# Patient Record
Sex: FEMALE | Race: OTHER | HISPANIC OR LATINO
[De-identification: names, ages, dates, MRNs, and addresses within clinical notes are randomized per-mention and may not be internally consistent; named-entity substitution may affect disease eponyms.]

---

## 2023-02-13 ENCOUNTER — APPOINTMENT (OUTPATIENT)
Dept: OBGYN | Facility: CLINIC | Age: 63
End: 2023-02-13
Payer: COMMERCIAL

## 2023-02-13 VITALS
DIASTOLIC BLOOD PRESSURE: 80 MMHG | HEIGHT: 60 IN | SYSTOLIC BLOOD PRESSURE: 121 MMHG | WEIGHT: 226 LBS | OXYGEN SATURATION: 96 % | BODY MASS INDEX: 44.37 KG/M2 | HEART RATE: 101 BPM

## 2023-02-13 DIAGNOSIS — N94.9 UNSPECIFIED CONDITION ASSOCIATED WITH FEMALE GENITAL ORGANS AND MENSTRUAL CYCLE: ICD-10-CM

## 2023-02-13 DIAGNOSIS — R10.9 UNSPECIFIED ABDOMINAL PAIN: ICD-10-CM

## 2023-02-13 DIAGNOSIS — N76.5 ULCERATION OF VAGINA: ICD-10-CM

## 2023-02-13 DIAGNOSIS — Z12.39 ENCOUNTER FOR OTHER SCREENING FOR MALIGNANT NEOPLASM OF BREAST: ICD-10-CM

## 2023-02-13 DIAGNOSIS — L90.0 LICHEN SCLEROSUS ET ATROPHICUS: ICD-10-CM

## 2023-02-13 PROCEDURE — 99386 PREV VISIT NEW AGE 40-64: CPT

## 2023-02-13 PROCEDURE — 99203 OFFICE O/P NEW LOW 30 MIN: CPT | Mod: 25

## 2023-02-14 ENCOUNTER — NON-APPOINTMENT (OUTPATIENT)
Age: 63
End: 2023-02-14

## 2023-02-14 LAB
HPV HIGH+LOW RISK DNA PNL CVX: NOT DETECTED
HSV+VZV DNA SPEC QL NAA+PROBE: NOT DETECTED
SPECIMEN SOURCE: NORMAL

## 2023-02-15 NOTE — COUNSELING
[Nutrition/ Exercise/ Weight Management] : nutrition, exercise, weight management [Body Image] : body image [Smoking Cessation] : smoking cessation [STD (testing, results, tx)] : STD (testing, results, tx) [Medication Management] : medication management

## 2023-02-15 NOTE — PLAN
[FreeTextEntry1] : Ms. Lema presents for well woman's  exam with complaint of vulvar and vaginal itching and findings concerning for lichen sclerosis. \par - Vitals reviewed and within normal limits. \par - Breast exam, pelvic exam and pap smear performed today. \par - Given Ulcerations noted, HSV swab obtained. \par - Findings concerning for lichen sclerosis  - patent agrees to trial of clobetasol. \par - Referral for breast US, screening mammogram and colonoscopy provided. \par - Patient preventative health actions reviewed-  encouraged well balanced diet and weight bearing exercise. Discussed patient's willingness to cut back smoking - patient is not interested. \par  \par Return to the office pending results, as needed for GYN concerns and in 1 year for annual follow up. Plan of care discussed with patient who has no additional questions and is in agreement.\par

## 2023-02-15 NOTE — PHYSICAL EXAM
[Chaperone Present] : A chaperone was present in the examining room during all aspects of the physical examination [Appropriately responsive] : appropriately responsive [Alert] : alert [No Acute Distress] : no acute distress [No Lesions] : no lesions [Oriented x3] : oriented x3 [FreeTextEntry3] : nontender thyroid [FreeTextEntry7] : no discrete mass palpated but possible umbilical hernia noted  [Examination Of The Breasts] : a normal appearance [Breast Atrophy Bilateral] : atrophy [No Masses] : no breast masses were palpable [Vulvar Atrophy] : vulvar atrophy [Vulvar Scarring] : vulvar scarring [Atrophy] : atrophy [Dry Mucosa] : dry mucosa [No Bleeding] : There was no active vaginal bleeding [Cervical Stenosis] : cervical stenosis [Normal] : normal [Uterine Adnexae] : non-palpable [FreeTextEntry1] : thinning of the skin, with white discoloration in a figure of 8 distribution on the vulvar and perineum ; ulcerations versus excoriations noted  [FreeTextEntry2] : loss of labial architecture with mild agglutination noted  [FreeTextEntry8] : Nontender, no CMT, no adnexal masses or fullness appreciated. exam limited by habitus

## 2023-02-15 NOTE — HISTORY OF PRESENT ILLNESS
[Patient reported mammogram was normal] : Patient reported mammogram was normal [Patient reported breast sonogram was normal] : Patient reported breast sonogram was normal [Patient reported PAP Smear was normal] : Patient reported PAP Smear was normal [Y] : Positive pregnancy history [Previously active] : previously active [FreeTextEntry1] : Ms. Carmencita Lema  is a 62 year old female who presents today for annual exam. She reports that she feels overall well but anxious about her appointment. She reports long standing history of vulvar and vaginal irritation and itching. \par \par OB history:  x2, ectopic x2\par GYN history: denies history of abnormal pap smears, last pap semar in 2018; endorses history of vulvar irritation and discoloration previously treated with clobetasol \par - Postmenopausal since age 57\par - no bothersome menopausal symptoms \par - not presently sexually active \par SocialHx: lives with her mother who has dementia; works for the department of [employer]; current pack per day smoker (not interested in cutting back or quitting at this time)\par \par Patient denies vaginal bleeding, vaginal discharge, dysuria, changes to her bowel habits, incontinence or any other GYN symptoms.\par  [Mammogramdate] : 2018 [BreastSonogramDate] : 2018 [PapSmeardate] : 2018 [PGxTotal] : 11 [[facility]] : 2 [PGxEctopic] : 2

## 2023-02-17 LAB
A VAGINAE DNA VAG QL NAA+PROBE: NORMAL
BVAB2 DNA VAG QL NAA+PROBE: NORMAL
C KRUSEI DNA VAG QL NAA+PROBE: NEGATIVE
C TRACH RRNA SPEC QL NAA+PROBE: NEGATIVE
CYTOLOGY CVX/VAG DOC THIN PREP: NORMAL
MEGA1 DNA VAG QL NAA+PROBE: NORMAL
N GONORRHOEA RRNA SPEC QL NAA+PROBE: NEGATIVE
T VAGINALIS RRNA SPEC QL NAA+PROBE: NEGATIVE

## 2023-02-28 ENCOUNTER — APPOINTMENT (OUTPATIENT)
Dept: GASTROENTEROLOGY | Facility: CLINIC | Age: 63
End: 2023-02-28
Payer: COMMERCIAL

## 2023-02-28 VITALS
HEART RATE: 84 BPM | TEMPERATURE: 97.4 F | WEIGHT: 223 LBS | SYSTOLIC BLOOD PRESSURE: 109 MMHG | DIASTOLIC BLOOD PRESSURE: 75 MMHG | OXYGEN SATURATION: 99 % | HEIGHT: 60 IN | BODY MASS INDEX: 43.78 KG/M2

## 2023-02-28 DIAGNOSIS — Z12.11 ENCOUNTER FOR SCREENING FOR MALIGNANT NEOPLASM OF COLON: ICD-10-CM

## 2023-02-28 DIAGNOSIS — R19.00 INTRA-ABDOMINAL AND PELVIC SWELLING, MASS AND LUMP, UNSPECIFIED SITE: ICD-10-CM

## 2023-02-28 PROCEDURE — 99204 OFFICE O/P NEW MOD 45 MIN: CPT

## 2023-02-28 RX ORDER — POLYETHYLENE GLYCOL 3350, SODIUM CHLORIDE, SODIUM BICARBONATE AND POTASSIUM CHLORIDE WITH LEMON FLAVOR 420; 11.2; 5.72; 1.48 G/4L; G/4L; G/4L; G/4L
420 POWDER, FOR SOLUTION ORAL
Qty: 1 | Refills: 0 | Status: ACTIVE | COMMUNITY
Start: 2023-02-28 | End: 1900-01-01

## 2023-02-28 NOTE — PHYSICAL EXAM
[Normal] : alert, normal voice/communication, healthy appearing, no acute distress [Obese (BMI >= 30)] : obese (BMI >= 30) [Bowel Sounds] : normal bowel sounds

## 2023-02-28 NOTE — HISTORY OF PRESENT ILLNESS
[FreeTextEntry1] : Preferred Language: English\par \par angelika gonzalez is a 62 year F here for CRC screening. She has a history of obesity\par \par GI History: Prior colonoscopy around 2010 with many polyps (> 10?)\par \par Current symptoms: Reports some pain/bulging in LUQ. Not related to eating. Intermittent and tender. Worsening over time. Denies any skin changes or excruciating pain. \par \par GI ROS negative for unintentional weight loss, fevers/chills, dysphagia, reflux, abdominal pain, bloating, nausea, vomiting, early satiety, diarrhea, constipation, melena, hematochezia. Patient denies NSAID use.\par \par Current Meds: None\par All: NKDA\par FHx: No GI cancers, son has UC\par SHx: Current smoker, rare EtOH\par \par Relevant Exam:\par Obese\par Abd wall tenderness LUQ, palpable mass\par \par \par \par

## 2023-02-28 NOTE — ASSESSMENT
[FreeTextEntry1] : Impression:\par #Hx of colon polyps - overdue for repeat colonoscopy \par #Abd wall mass - agree w/ Dr. Trevino (gyn) that this is likely umbilical hernia. \par \par Plan:\par - Schedule colonoscopy for surveillance, hx of colon polyps. [facility] w/ adult scope given BMI\par - Risks (including anesthesia complications, bleeding, infection, perforation) as well as benefits, alternatives, and details of procedure discussed w/ patient.\par - Prep instructions discussed at length and written materials provided.\par - Golytely prep ordered.\par - COVID swab ordered.\par - Need for chaperone discussed.\par

## 2023-03-01 DIAGNOSIS — Z12.4 ENCOUNTER FOR SCREENING FOR MALIGNANT NEOPLASM OF CERVIX: ICD-10-CM

## 2023-03-01 LAB
ALBUMIN SERPL ELPH-MCNC: 4.1 G/DL
ALP BLD-CCNC: 63 U/L
ALT SERPL-CCNC: 23 U/L
ANION GAP SERPL CALC-SCNC: 17 MMOL/L
AST SERPL-CCNC: 17 U/L
BASOPHILS # BLD AUTO: 0.06 K/UL
BASOPHILS NFR BLD AUTO: 0.6 %
BILIRUB SERPL-MCNC: <0.2 MG/DL
BUN SERPL-MCNC: 18 MG/DL
CALCIUM SERPL-MCNC: 10 MG/DL
CHLORIDE SERPL-SCNC: 101 MMOL/L
CHOLEST SERPL-MCNC: 217 MG/DL
CO2 SERPL-SCNC: 22 MMOL/L
CREAT SERPL-MCNC: 0.87 MG/DL
EGFR: 76 ML/MIN/1.73M2
EOSINOPHIL # BLD AUTO: 0.24 K/UL
EOSINOPHIL NFR BLD AUTO: 2.5 %
ESTIMATED AVERAGE GLUCOSE: 146 MG/DL
FERRITIN SERPL-MCNC: 164 NG/ML
GLUCOSE SERPL-MCNC: 103 MG/DL
HBA1C MFR BLD HPLC: 6.7 %
HCT VFR BLD CALC: 42.5 %
HCV AB SER QL: NONREACTIVE
HCV S/CO RATIO: 0.13 S/CO
HDLC SERPL-MCNC: 40 MG/DL
HGB BLD-MCNC: 13.9 G/DL
IMM GRANULOCYTES NFR BLD AUTO: 0.3 %
IRON SATN MFR SERPL: 24 %
IRON SERPL-MCNC: 82 UG/DL
LDLC SERPL CALC-MCNC: NORMAL MG/DL
LYMPHOCYTES # BLD AUTO: 3.08 K/UL
LYMPHOCYTES NFR BLD AUTO: 32.4 %
MAN DIFF?: NORMAL
MCHC RBC-ENTMCNC: 31.3 PG
MCHC RBC-ENTMCNC: 32.7 GM/DL
MCV RBC AUTO: 95.7 FL
MONOCYTES # BLD AUTO: 0.67 K/UL
MONOCYTES NFR BLD AUTO: 7 %
NEUTROPHILS # BLD AUTO: 5.43 K/UL
NEUTROPHILS NFR BLD AUTO: 57.2 %
NONHDLC SERPL-MCNC: 178 MG/DL
PLATELET # BLD AUTO: 284 K/UL
POTASSIUM SERPL-SCNC: 4.5 MMOL/L
PROT SERPL-MCNC: 7.5 G/DL
RBC # BLD: 4.44 M/UL
RBC # FLD: 13.8 %
SODIUM SERPL-SCNC: 141 MMOL/L
TIBC SERPL-MCNC: 338 UG/DL
TRIGL SERPL-MCNC: 453 MG/DL
UIBC SERPL-MCNC: 256 UG/DL
WBC # FLD AUTO: 9.51 K/UL

## 2023-03-03 ENCOUNTER — APPOINTMENT (OUTPATIENT)
Dept: INTERNAL MEDICINE | Facility: CLINIC | Age: 63
End: 2023-03-03
Payer: COMMERCIAL

## 2023-03-03 ENCOUNTER — NON-APPOINTMENT (OUTPATIENT)
Age: 63
End: 2023-03-03

## 2023-03-03 ENCOUNTER — TRANSCRIPTION ENCOUNTER (OUTPATIENT)
Age: 63
End: 2023-03-03

## 2023-03-03 VITALS
TEMPERATURE: 97 F | DIASTOLIC BLOOD PRESSURE: 55 MMHG | WEIGHT: 222 LBS | OXYGEN SATURATION: 96 % | BODY MASS INDEX: 43.36 KG/M2 | SYSTOLIC BLOOD PRESSURE: 90 MMHG | HEART RATE: 84 BPM

## 2023-03-03 VITALS — DIASTOLIC BLOOD PRESSURE: 75 MMHG | SYSTOLIC BLOOD PRESSURE: 112 MMHG

## 2023-03-03 DIAGNOSIS — J45.20 MILD INTERMITTENT ASTHMA, UNCOMPLICATED: ICD-10-CM

## 2023-03-03 DIAGNOSIS — F32.A ANXIETY DISORDER, UNSPECIFIED: ICD-10-CM

## 2023-03-03 DIAGNOSIS — F41.9 ANXIETY DISORDER, UNSPECIFIED: ICD-10-CM

## 2023-03-03 DIAGNOSIS — J44.9 CHRONIC OBSTRUCTIVE PULMONARY DISEASE, UNSPECIFIED: ICD-10-CM

## 2023-03-03 DIAGNOSIS — Z84.89 FAMILY HISTORY OF OTHER SPECIFIED CONDITIONS: ICD-10-CM

## 2023-03-03 DIAGNOSIS — Z83.3 FAMILY HISTORY OF DIABETES MELLITUS: ICD-10-CM

## 2023-03-03 DIAGNOSIS — Z00.00 ENCOUNTER FOR GENERAL ADULT MEDICAL EXAMINATION W/OUT ABNORMAL FINDINGS: ICD-10-CM

## 2023-03-03 DIAGNOSIS — Z82.49 FAMILY HISTORY OF ISCHEMIC HEART DISEASE AND OTHER DISEASES OF THE CIRCULATORY SYSTEM: ICD-10-CM

## 2023-03-03 DIAGNOSIS — Z83.79 FAMILY HISTORY OF OTHER DISEASES OF THE DIGESTIVE SYSTEM: ICD-10-CM

## 2023-03-03 DIAGNOSIS — H81.11 BENIGN PAROXYSMAL VERTIGO, RIGHT EAR: ICD-10-CM

## 2023-03-03 DIAGNOSIS — Z23 ENCOUNTER FOR IMMUNIZATION: ICD-10-CM

## 2023-03-03 PROCEDURE — 99386 PREV VISIT NEW AGE 40-64: CPT | Mod: 25

## 2023-03-03 PROCEDURE — 36415 COLL VENOUS BLD VENIPUNCTURE: CPT

## 2023-03-03 PROCEDURE — 90686 IIV4 VACC NO PRSV 0.5 ML IM: CPT

## 2023-03-03 PROCEDURE — 93000 ELECTROCARDIOGRAM COMPLETE: CPT

## 2023-03-03 PROCEDURE — G0008: CPT

## 2023-03-03 PROCEDURE — 99203 OFFICE O/P NEW LOW 30 MIN: CPT | Mod: 25

## 2023-03-03 RX ORDER — FLUTICASONE FUROATE, UMECLIDINIUM BROMIDE AND VILANTEROL TRIFENATATE 200; 62.5; 25 UG/1; UG/1; UG/1
200-62.5-25 POWDER RESPIRATORY (INHALATION)
Refills: 0 | Status: ACTIVE | COMMUNITY

## 2023-03-03 NOTE — HEALTH RISK ASSESSMENT
[Current] : Current [20 or more] : 20 or more [No] : In the past 12 months have you used drugs other than those required for medical reasons? No [Nearly Every Day (3)] : 6.) Feeling bad about yourself, or that you are a failure, or have let yourself or your family down? Nearly every day [Several Days (1)] : 7.) Trouble concentrating on things, such as reading a newspaper or watching television? Several days [Not at All (0)] : 8.) Moving or speaking so slowly that other people could have noticed, or the opposite, moving or speaking faster than usual? Not at all [Mild] : severity of depression is mild [Somewhat Difficult] : How difficult have these problems made it for you to do your work, take care of things at home, or get along with people? Somewhat difficult [PHQ-9 Positive] : PHQ-9 Positive [I have developed a follow-up plan documented below in the note.] : I have developed a follow-up plan documented below in the note. [Fully functional (bathing, dressing, toileting, transferring, walking, feeding)] : Fully functional (bathing, dressing, toileting, transferring, walking, feeding) [Fully functional (using the telephone, shopping, preparing meals, housekeeping, doing laundry, using] : Fully functional and needs no help or supervision to perform IADLs (using the telephone, shopping, preparing meals, housekeeping, doing laundry, using transportation, managing medications and managing finances) [Reports changes in vision] : Reports changes in vision [de-identified] : walking  [de-identified] : unhealthy diet  [[id number]] : 7 [Reports changes in hearing] : Reports no changes in hearing [Reports changes in dental health] : Reports no changes in dental health

## 2023-03-03 NOTE — HISTORY OF PRESENT ILLNESS
[de-identified] : Ms. angelika gonzalez is a 62 year old female smoker with history of Diabetes, Asthma/COPD, HLD, and obesity presenting today for CPE. She saw Dr. Montalvo earlier this week to discuss CRC screening and she is scheduled for colonoscopy next month as well as ABD sono for possible hernia. She also had her annual GYN viist with Dr. Trevino last month and likely has lichen sclerosis being treated with topical steroid. Last year she moved in with her mother who has dementia and most of her doctors were in [place]. She still follows with her pulmonologist in [place], but requests a referral to a doctor closer to her.

## 2023-03-03 NOTE — ASSESSMENT
[FreeTextEntry1] : #HCM\par - Flu vaccine today\par - due for COVID booster\par - had Tdap and pneumonia vaccine in [place]; will obtain records\par - discussed age appropriate cancer screenings and vaccines\par - discussed healthy diet and exercise\par - follow up in 3 months

## 2023-03-03 NOTE — PHYSICAL EXAM
[No Respiratory Distress] : no respiratory distress  [No Accessory Muscle Use] : no accessory muscle use [Decreased Breath Sounds] : breath sounds were decreased diffusely [No Edema] : there was no peripheral edema [Soft] : abdomen soft [No HSM] : no HSM [Normal Bowel Sounds] : normal bowel sounds [Normal] : affect was normal and insight and judgment were intact [de-identified] : mild  periumbilical TTP  [de-identified] : numerous melanotic nevi

## 2023-03-06 LAB
APPEARANCE: CLEAR
BACTERIA: NEGATIVE
BILIRUBIN URINE: NEGATIVE
BLOOD URINE: ABNORMAL
CHOLEST SERPL-MCNC: 222 MG/DL
COLOR: YELLOW
CREAT SPEC-SCNC: 219 MG/DL
GLUCOSE QUALITATIVE U: NEGATIVE
HDLC SERPL-MCNC: 44 MG/DL
HYALINE CASTS: 1 /LPF
KETONES URINE: NEGATIVE
LDLC SERPL CALC-MCNC: 146 MG/DL
LEUKOCYTE ESTERASE URINE: NEGATIVE
MICROALBUMIN 24H UR DL<=1MG/L-MCNC: 6.6 MG/DL
MICROALBUMIN/CREAT 24H UR-RTO: 30 MG/G
MICROSCOPIC-UA: NORMAL
NITRITE URINE: NEGATIVE
NONHDLC SERPL-MCNC: 178 MG/DL
PH URINE: 6
PROTEIN URINE: NORMAL
RED BLOOD CELLS URINE: 7 /HPF
SPECIFIC GRAVITY URINE: >=1.03
SQUAMOUS EPITHELIAL CELLS: 6 /HPF
TRIGL SERPL-MCNC: 163 MG/DL
UROBILINOGEN URINE: NORMAL
WHITE BLOOD CELLS URINE: 3 /HPF

## 2023-03-06 RX ORDER — ALBUTEROL SULFATE 90 UG/1
108 (90 BASE) INHALANT RESPIRATORY (INHALATION)
Qty: 1 | Refills: 5 | Status: ACTIVE | COMMUNITY
Start: 1900-01-01 | End: 1900-01-01

## 2023-03-27 ENCOUNTER — NON-APPOINTMENT (OUTPATIENT)
Age: 63
End: 2023-03-27

## 2023-03-27 ENCOUNTER — APPOINTMENT (OUTPATIENT)
Dept: OPHTHALMOLOGY | Facility: CLINIC | Age: 63
End: 2023-03-27
Payer: COMMERCIAL

## 2023-03-27 PROCEDURE — 92004 COMPRE OPH EXAM NEW PT 1/>: CPT

## 2023-04-06 ENCOUNTER — TRANSCRIPTION ENCOUNTER (OUTPATIENT)
Age: 63
End: 2023-04-06

## 2023-04-07 ENCOUNTER — OUTPATIENT (OUTPATIENT)
Dept: OUTPATIENT SERVICES | Facility: HOSPITAL | Age: 63
LOS: 1 days | Discharge: HOME | End: 2023-04-07
Payer: COMMERCIAL

## 2023-04-07 ENCOUNTER — RESULT REVIEW (OUTPATIENT)
Age: 63
End: 2023-04-07

## 2023-04-07 ENCOUNTER — APPOINTMENT (OUTPATIENT)
Dept: GASTROENTEROLOGY | Facility: HOSPITAL | Age: 63
End: 2023-04-07

## 2023-04-07 PROCEDURE — 88305 TISSUE EXAM BY PATHOLOGIST: CPT | Mod: 26

## 2023-04-07 PROCEDURE — 88305 TISSUE EXAM BY PATHOLOGIST: CPT

## 2023-04-07 PROCEDURE — 45380 COLONOSCOPY AND BIOPSY: CPT | Mod: XU

## 2023-04-07 PROCEDURE — 45385 COLONOSCOPY W/LESION REMOVAL: CPT

## 2023-04-07 PROCEDURE — 45385 COLONOSCOPY W/LESION REMOVAL: CPT | Mod: PT

## 2023-04-07 RX ORDER — SODIUM CHLORIDE 9 MG/ML
1000 INJECTION, SOLUTION INTRAVENOUS
Refills: 0 | Status: DISCONTINUED | OUTPATIENT
Start: 2023-04-07 | End: 2023-04-07

## 2023-04-10 ENCOUNTER — NON-APPOINTMENT (OUTPATIENT)
Age: 63
End: 2023-04-10

## 2023-04-10 ENCOUNTER — APPOINTMENT (OUTPATIENT)
Dept: OBGYN | Facility: CLINIC | Age: 63
End: 2023-04-10
Payer: COMMERCIAL

## 2023-04-10 VITALS — SYSTOLIC BLOOD PRESSURE: 107 MMHG | OXYGEN SATURATION: 96 % | DIASTOLIC BLOOD PRESSURE: 71 MMHG | HEART RATE: 73 BPM

## 2023-04-10 LAB — SURGICAL PATHOLOGY STUDY: SIGNIFICANT CHANGE UP

## 2023-04-10 PROCEDURE — 99213 OFFICE O/P EST LOW 20 MIN: CPT

## 2023-04-10 RX ORDER — CLOBETASOL PROPIONATE 0.5 MG/G
0.05 OINTMENT TOPICAL
Qty: 1 | Refills: 2 | Status: ACTIVE | COMMUNITY
Start: 2023-02-13 | End: 1900-01-01

## 2023-04-14 ENCOUNTER — TRANSCRIPTION ENCOUNTER (OUTPATIENT)
Age: 63
End: 2023-04-14

## 2023-04-14 RX ORDER — BLOOD-GLUCOSE METER
W/DEVICE KIT MISCELLANEOUS
Qty: 1 | Refills: 0 | Status: ACTIVE | COMMUNITY
Start: 2023-03-03 | End: 1900-01-01

## 2023-04-16 NOTE — PLAN
[FreeTextEntry1] : Ms. Lema presents for follow up pelvic exam in the setting of lichen sclerosis. \par - Vitals reviewed and within normal limits. \par - Physical exam fiindings improved - continue daily clobetasol for two months.  \par \par Plan of care discussed with patient who has no additional questions and is in agreement.\par \par

## 2023-04-16 NOTE — PHYSICAL EXAM
[Chaperone Present] : A chaperone was present in the examining room during all aspects of the physical examination [Appropriately responsive] : appropriately responsive [Alert] : alert [No Acute Distress] : no acute distress [Oriented x3] : oriented x3 [Vulvar Atrophy] : vulvar atrophy [Vulvar Scarring] : vulvar scarring [Normal] : normal [Atrophy] : atrophy [Dry Mucosa] : dry mucosa [No Bleeding] : There was no active vaginal bleeding [FreeTextEntry1] : decreased white discoloration; decreased erythema  [FreeTextEntry2] : loss of labial architecture with minimal agglutination noted

## 2023-04-16 NOTE — HISTORY OF PRESENT ILLNESS
[FreeTextEntry1] : Ms. RIVKA OWEN  is a 63 year old female who presents today for follow up pelvic exam in the setting of lichen sclerosis. She has been using the clobetasol daily. Her vaginal irritation has improved and she has minimal itching. She reports that she feels overall well and that she has no other GYN complaints.  \par \par Patient denies vaginal bleeding, vaginal discharge, dysuria, changes to her bowel habits, incontinence or any other GYN symptoms.\par

## 2023-04-25 ENCOUNTER — APPOINTMENT (OUTPATIENT)
Dept: INTERNAL MEDICINE | Facility: CLINIC | Age: 63
End: 2023-04-25
Payer: COMMERCIAL

## 2023-04-25 VITALS
OXYGEN SATURATION: 98 % | BODY MASS INDEX: 43.59 KG/M2 | HEIGHT: 60 IN | SYSTOLIC BLOOD PRESSURE: 109 MMHG | HEART RATE: 74 BPM | TEMPERATURE: 97.5 F | DIASTOLIC BLOOD PRESSURE: 65 MMHG | WEIGHT: 222 LBS

## 2023-04-25 DIAGNOSIS — E66.01 MORBID (SEVERE) OBESITY DUE TO EXCESS CALORIES: ICD-10-CM

## 2023-04-25 PROCEDURE — 99214 OFFICE O/P EST MOD 30 MIN: CPT

## 2023-04-25 RX ORDER — METFORMIN HYDROCHLORIDE 500 MG/1
500 TABLET, COATED ORAL
Qty: 2 | Refills: 1 | Status: ACTIVE | COMMUNITY
Start: 2023-04-25 | End: 1900-01-01

## 2023-04-25 NOTE — HISTORY OF PRESENT ILLNESS
[de-identified] : Ms. angelika gonzalez is a 63 year old female smoker with history of Diabetes, Asthma/COPD, HLD, and obesity presenting today for follow up to complete forms for work. She has questions about her T2DM.

## 2023-04-25 NOTE — PHYSICAL EXAM
[Normal Sclera/Conjunctiva] : normal sclera/conjunctiva [Normal Outer Ear/Nose] : the outer ears and nose were normal in appearance [No Edema] : there was no peripheral edema [Soft] : abdomen soft [Non Tender] : non-tender [Normal] : affect was normal and insight and judgment were intact

## 2023-04-28 ENCOUNTER — APPOINTMENT (OUTPATIENT)
Dept: DERMATOLOGY | Facility: CLINIC | Age: 63
End: 2023-04-28
Payer: COMMERCIAL

## 2023-04-28 DIAGNOSIS — Z12.83 ENCOUNTER FOR SCREENING FOR MALIGNANT NEOPLASM OF SKIN: ICD-10-CM

## 2023-04-28 DIAGNOSIS — L82.1 OTHER SEBORRHEIC KERATOSIS: ICD-10-CM

## 2023-04-28 DIAGNOSIS — L91.8 OTHER HYPERTROPHIC DISORDERS OF THE SKIN: ICD-10-CM

## 2023-04-28 DIAGNOSIS — D18.01 HEMANGIOMA OF SKIN AND SUBCUTANEOUS TISSUE: ICD-10-CM

## 2023-04-28 PROCEDURE — 99203 OFFICE O/P NEW LOW 30 MIN: CPT

## 2023-05-01 PROBLEM — D18.01 CHERRY ANGIOMA: Status: ACTIVE | Noted: 2023-05-01

## 2023-05-01 PROBLEM — L82.1 DERMATOSIS PAPULOSA NIGRA: Status: ACTIVE | Noted: 2023-05-01

## 2023-05-01 PROBLEM — L91.8 CUTANEOUS SKIN TAGS: Status: ACTIVE | Noted: 2023-05-01

## 2023-05-01 PROBLEM — Z12.83 SKIN CANCER SCREENING: Status: ACTIVE | Noted: 2023-03-03

## 2023-05-01 NOTE — PHYSICAL EXAM
[Alert] : alert [Oriented x 3] : ~L oriented x 3 [Full Body Skin Exam Performed] : performed [FreeTextEntry3] : Pt consented to examination of external genitalia and buttocks\par \par Small erythematous papules scattered\par Stuck on brown papules on face, neck, upper chest\par Pedunculated skin toned papules scattered\par \par

## 2023-05-01 NOTE — ASSESSMENT
[FreeTextEntry1] : # Dermatosis papulosa nigra\par - Benign, reassured\par - Discussed cosmetic removal if desired\par \par # Skin tags\par - Benign, reassured\par - Discussed cosmetic removal if desired\par \par # Cherry angiomas\par - Benign, reassured\par \par # Skin cancer screening\par - A complete skin exam was performed\par - No concerning lesions identified\par - Photoprotection was discussed including sunscreen\par - Call for new or changing lesions\par - CBE yearly\par 
0.71

## 2023-05-01 NOTE — HISTORY OF PRESENT ILLNESS
[FreeTextEntry1] : Skin lesions, moles - NPA [de-identified] : # Mole/skin check\par Concerns today: moles on face, chest, body\par Sunscreen use: no\par Personal history of skin cancer: none\par Family history of melanoma: none\par

## 2023-05-05 LAB
M TB IFN-G BLD-IMP: NEGATIVE
QUANTIFERON TB PLUS MITOGEN MINUS NIL: 4.58 IU/ML
QUANTIFERON TB PLUS NIL: 0.01 IU/ML
QUANTIFERON TB PLUS TB1 MINUS NIL: 0 IU/ML
QUANTIFERON TB PLUS TB2 MINUS NIL: 0 IU/ML

## 2023-06-05 ENCOUNTER — APPOINTMENT (OUTPATIENT)
Dept: OBGYN | Facility: CLINIC | Age: 63
End: 2023-06-05

## 2023-08-09 ENCOUNTER — APPOINTMENT (OUTPATIENT)
Dept: INTERNAL MEDICINE | Facility: CLINIC | Age: 63
End: 2023-08-09
Payer: COMMERCIAL

## 2023-08-09 VITALS
DIASTOLIC BLOOD PRESSURE: 77 MMHG | SYSTOLIC BLOOD PRESSURE: 106 MMHG | TEMPERATURE: 96.7 F | HEART RATE: 86 BPM | BODY MASS INDEX: 41.6 KG/M2 | WEIGHT: 213 LBS | OXYGEN SATURATION: 94 %

## 2023-08-09 DIAGNOSIS — F17.200 NICOTINE DEPENDENCE, UNSPECIFIED, UNCOMPLICATED: ICD-10-CM

## 2023-08-09 DIAGNOSIS — M54.32 SCIATICA, LEFT SIDE: ICD-10-CM

## 2023-08-09 DIAGNOSIS — E78.00 PURE HYPERCHOLESTEROLEMIA, UNSPECIFIED: ICD-10-CM

## 2023-08-09 PROCEDURE — 36415 COLL VENOUS BLD VENIPUNCTURE: CPT

## 2023-08-09 PROCEDURE — 99214 OFFICE O/P EST MOD 30 MIN: CPT | Mod: 25

## 2023-08-09 RX ORDER — METHYLPREDNISOLONE 4 MG/1
4 TABLET ORAL
Qty: 1 | Refills: 0 | Status: ACTIVE | COMMUNITY
Start: 2023-08-09 | End: 1900-01-01

## 2023-08-09 NOTE — HISTORY OF PRESENT ILLNESS
[de-identified] : Covering Dr Varghese. 63 yr old w obesity, DM2, HLD, here c/o left sciatica. Camping last week tweaked her back, pain radiating from lower back on left down left leg; no weakness or numbness, but significant pain, pain ambulating.  Taking metformin. Discussed ozempic again; wiling to try.

## 2023-08-09 NOTE — PLAN
[FreeTextEntry1] :  Start Ozempic  f/u 3 mo  re sciatica advised on goal of weight loss, to maintain activity etc f/u prn

## 2023-08-10 LAB
ALBUMIN SERPL ELPH-MCNC: 4.1 G/DL
ALP BLD-CCNC: 64 U/L
ALT SERPL-CCNC: 28 U/L
ANION GAP SERPL CALC-SCNC: 12 MMOL/L
AST SERPL-CCNC: 19 U/L
BILIRUB SERPL-MCNC: 0.2 MG/DL
BUN SERPL-MCNC: 16 MG/DL
CALCIUM SERPL-MCNC: 9.8 MG/DL
CHLORIDE SERPL-SCNC: 106 MMOL/L
CHOLEST SERPL-MCNC: 163 MG/DL
CO2 SERPL-SCNC: 23 MMOL/L
CREAT SERPL-MCNC: 0.76 MG/DL
EGFR: 89 ML/MIN/1.73M2
ESTIMATED AVERAGE GLUCOSE: 143 MG/DL
GLUCOSE SERPL-MCNC: 98 MG/DL
HBA1C MFR BLD HPLC: 6.6 %
HDLC SERPL-MCNC: 40 MG/DL
LDLC SERPL CALC-MCNC: 86 MG/DL
NONHDLC SERPL-MCNC: 123 MG/DL
POTASSIUM SERPL-SCNC: 4.4 MMOL/L
PROT SERPL-MCNC: 7 G/DL
SODIUM SERPL-SCNC: 141 MMOL/L
TRIGL SERPL-MCNC: 219 MG/DL

## 2023-11-14 DIAGNOSIS — Z02.1 ENCOUNTER FOR PRE-EMPLOYMENT EXAMINATION: ICD-10-CM

## 2023-11-15 ENCOUNTER — RX RENEWAL (OUTPATIENT)
Age: 63
End: 2023-11-15

## 2023-11-20 LAB
MEV IGG FLD QL IA: 97.8 AU/ML
MEV IGG+IGM SER-IMP: POSITIVE
MUV AB SER-ACNC: POSITIVE
MUV IGG SER QL IA: >300 AU/ML
RUBV IGG FLD-ACNC: 7.3 INDEX
RUBV IGG SER-IMP: POSITIVE
VZV AB TITR SER: POSITIVE
VZV IGG SER IF-ACNC: 481.9 INDEX

## 2023-11-21 LAB
AMPHET UR-MCNC: NEGATIVE NG/ML
BARBITURATES UR-MCNC: NEGATIVE NG/ML
BENZODIAZ UR-MCNC: NEGATIVE NG/ML
COCAINE METAB.OTHER UR-MCNC: NEGATIVE NG/ML
CREATININE, URINE: 137.4 MG/DL
FENTANYL, URINE: NEGATIVE NG/ML
METHADONE UR-MCNC: NEGATIVE NG/ML
OPIATES UR-MCNC: NEGATIVE NG/ML
OXYCODONE/OXYMORPHONE, URINE: NEGATIVE NG/ML
PCP UR-MCNC: NEGATIVE NG/ML
PH, URINE: 5.3
PLEASE NOTE: DRUGSCRUR: NORMAL
THC UR QL: NEGATIVE NG/ML

## 2024-01-02 ENCOUNTER — TRANSCRIPTION ENCOUNTER (OUTPATIENT)
Age: 64
End: 2024-01-02

## 2024-03-05 ENCOUNTER — APPOINTMENT (OUTPATIENT)
Dept: INTERNAL MEDICINE | Facility: CLINIC | Age: 64
End: 2024-03-05
Payer: COMMERCIAL

## 2024-03-05 VITALS
WEIGHT: 204 LBS | BODY MASS INDEX: 40.05 KG/M2 | HEART RATE: 79 BPM | OXYGEN SATURATION: 98 % | SYSTOLIC BLOOD PRESSURE: 119 MMHG | DIASTOLIC BLOOD PRESSURE: 78 MMHG | TEMPERATURE: 97.5 F | HEIGHT: 60 IN

## 2024-03-05 DIAGNOSIS — E11.9 TYPE 2 DIABETES MELLITUS W/OUT COMPLICATIONS: ICD-10-CM

## 2024-03-05 DIAGNOSIS — R42 DIZZINESS AND GIDDINESS: ICD-10-CM

## 2024-03-05 PROCEDURE — 99214 OFFICE O/P EST MOD 30 MIN: CPT

## 2024-03-05 PROCEDURE — 36415 COLL VENOUS BLD VENIPUNCTURE: CPT

## 2024-03-05 PROCEDURE — G2211 COMPLEX E/M VISIT ADD ON: CPT

## 2024-03-05 RX ORDER — BLOOD-GLUCOSE,RECEIVER,CONT
EACH MISCELLANEOUS
Qty: 1 | Refills: 0 | Status: ACTIVE | COMMUNITY
Start: 2024-03-05 | End: 1900-01-01

## 2024-03-05 RX ORDER — MECLIZINE HYDROCHLORIDE 25 MG/1
25 TABLET ORAL 3 TIMES DAILY
Qty: 21 | Refills: 0 | Status: ACTIVE | COMMUNITY
Start: 2024-03-05 | End: 1900-01-01

## 2024-03-05 RX ORDER — BLOOD-GLUCOSE SENSOR
EACH MISCELLANEOUS
Qty: 6 | Refills: 1 | Status: ACTIVE | COMMUNITY
Start: 2024-03-05 | End: 1900-01-01

## 2024-03-06 LAB
ALBUMIN SERPL ELPH-MCNC: 4.2 G/DL
ALP BLD-CCNC: 69 U/L
ALT SERPL-CCNC: 21 U/L
ANION GAP SERPL CALC-SCNC: 12 MMOL/L
AST SERPL-CCNC: 18 U/L
BASOPHILS # BLD AUTO: 0.06 K/UL
BASOPHILS NFR BLD AUTO: 0.6 %
BILIRUB SERPL-MCNC: 0.2 MG/DL
BUN SERPL-MCNC: 15 MG/DL
CALCIUM SERPL-MCNC: 10.1 MG/DL
CHLORIDE SERPL-SCNC: 105 MMOL/L
CHOLEST SERPL-MCNC: 200 MG/DL
CO2 SERPL-SCNC: 24 MMOL/L
CREAT SERPL-MCNC: 0.91 MG/DL
EGFR: 72 ML/MIN/1.73M2
EOSINOPHIL # BLD AUTO: 0.16 K/UL
EOSINOPHIL NFR BLD AUTO: 1.6 %
ESTIMATED AVERAGE GLUCOSE: 128 MG/DL
GLUCOSE SERPL-MCNC: 95 MG/DL
HBA1C MFR BLD HPLC: 6.1 %
HCT VFR BLD CALC: 41.4 %
HDLC SERPL-MCNC: 40 MG/DL
HGB BLD-MCNC: 13.2 G/DL
IMM GRANULOCYTES NFR BLD AUTO: 0.3 %
LDLC SERPL CALC-MCNC: 121 MG/DL
LYMPHOCYTES # BLD AUTO: 3.21 K/UL
LYMPHOCYTES NFR BLD AUTO: 32.6 %
MAN DIFF?: NORMAL
MCHC RBC-ENTMCNC: 30.1 PG
MCHC RBC-ENTMCNC: 31.9 GM/DL
MCV RBC AUTO: 94.3 FL
MONOCYTES # BLD AUTO: 0.74 K/UL
MONOCYTES NFR BLD AUTO: 7.5 %
NEUTROPHILS # BLD AUTO: 5.65 K/UL
NEUTROPHILS NFR BLD AUTO: 57.4 %
NONHDLC SERPL-MCNC: 160 MG/DL
PLATELET # BLD AUTO: 271 K/UL
POTASSIUM SERPL-SCNC: 4.6 MMOL/L
PROT SERPL-MCNC: 7.1 G/DL
RBC # BLD: 4.39 M/UL
RBC # FLD: 14.7 %
SODIUM SERPL-SCNC: 141 MMOL/L
TRIGL SERPL-MCNC: 224 MG/DL
WBC # FLD AUTO: 9.85 K/UL

## 2024-03-18 ENCOUNTER — APPOINTMENT (OUTPATIENT)
Dept: PHYSICAL MEDICINE AND REHAB | Facility: CLINIC | Age: 64
End: 2024-03-18
Payer: COMMERCIAL

## 2024-03-18 VITALS
HEIGHT: 60 IN | BODY MASS INDEX: 40.25 KG/M2 | SYSTOLIC BLOOD PRESSURE: 92 MMHG | WEIGHT: 205 LBS | HEART RATE: 80 BPM | RESPIRATION RATE: 18 BRPM | DIASTOLIC BLOOD PRESSURE: 68 MMHG

## 2024-03-18 DIAGNOSIS — G89.29 LOW BACK PAIN, UNSPECIFIED: ICD-10-CM

## 2024-03-18 DIAGNOSIS — R52 PAIN, UNSPECIFIED: ICD-10-CM

## 2024-03-18 DIAGNOSIS — M25.569 PAIN IN UNSPECIFIED KNEE: ICD-10-CM

## 2024-03-18 DIAGNOSIS — M22.2X2 PATELLOFEMORAL DISORDERS, RIGHT KNEE: ICD-10-CM

## 2024-03-18 DIAGNOSIS — G89.29 PAIN IN RIGHT KNEE: ICD-10-CM

## 2024-03-18 DIAGNOSIS — M54.50 LOW BACK PAIN, UNSPECIFIED: ICD-10-CM

## 2024-03-18 DIAGNOSIS — M71.21 SYNOVIAL CYST OF POPLITEAL SPACE [BAKER], RIGHT KNEE: ICD-10-CM

## 2024-03-18 DIAGNOSIS — M25.561 PAIN IN RIGHT KNEE: ICD-10-CM

## 2024-03-18 DIAGNOSIS — M22.2X1 PATELLOFEMORAL DISORDERS, RIGHT KNEE: ICD-10-CM

## 2024-03-18 PROCEDURE — G2211 COMPLEX E/M VISIT ADD ON: CPT

## 2024-03-18 PROCEDURE — 99205 OFFICE O/P NEW HI 60 MIN: CPT

## 2024-03-18 RX ORDER — NAPROXEN SODIUM 550 MG/1
550 TABLET ORAL
Qty: 60 | Refills: 2 | Status: ACTIVE | COMMUNITY
Start: 2024-03-18 | End: 1900-01-01

## 2024-03-18 NOTE — CONSULT LETTER
[FreeTextEntry1] : Dear Dr. GUILLERMINA CHAMPION  ,   I had the pleasure of evaluating your patient, RIVKA OWEN .  Thank you very much for allowing me to participate in the care of this patient. If you have any questions, please do not hesitate to contact me.   Sincerely,  Bryan Colon MD   ABPMR Board Certified in Physical Medicine and Rehabilitation Certified Fellow of AANEM (Neuromuscular and Electrodiagnostic Medicine) Subspecialty certified in Sports Medicine (ABPMR)   of Physical Medicine and Rehabilitation [employer] [facility] [employer] [facility]

## 2024-03-18 NOTE — PHYSICAL EXAM
[FreeTextEntry1] : PHYSICAL EXAM : OBJECTIVE   GENERAL : Awake ,alert and oriented to time place and person  HEAD : normocephalic and atraumatic  NECK : supple ,no tracheal deviation ,no thyroid enlargement noted with swallowing EYES : sclera and conjunctiva normal no redness,intact extraocular movements  ENT  : ears and nose normal in appearance -hearing adequate  PULMONARY: effort normal. No respiratory distress. breathing regular. No wheezes  LYMPH : No swelling in limbs, capillary return within normal range  CVS : warm extremities,no palpitations,not short of breath, no visible jugular venous distention PSYCH : mood and affect normal ,good eye contact ,normal attention  ABDOMEN : no visible distension ,  NEUROLOGICAL:cranial nerves intact,muscle tone normal,gait and balance safe except where noted below  SKIN : warm and dry No rash detected over specific body areas examined  MUSCULOSKELETAL: normal muscle bulk, no focal bony tenderness /posture normal except where specified below R knee cool full ROM  tender med joint line  no crepitus  tender bakers cyst   spine stiff  poor core overweight  SLR neg  wide based gait no cane

## 2024-03-18 NOTE — ASSESSMENT
[FreeTextEntry1] :   PLAN AND RECOMMENDATIONS :   We discussed differential diagnosis and clinical impression  advise work up rule out gout or RA re R knee pain advise xrays and MR spine as well as pain MD DR GALVEZ  advise xrays R knee  advise naproxen for bad days and blake sleep -cannot due to knee pain  Recommend .symptomatic care and support  medications advise prescription NSAIDS for both pain and anti inflammation  to help with healing / calming the soft tissue and reducing swelling- for at least 2 weeks strict -Naprosyn 500 mg BID after food -warned of possible GI side effects -advised to take with meals or add over the counter Nexium, if sensitive- if GI upset (nausea, vomiting, heartburn) becomes noticeable -stop medication and notify the office.     imaging as above   referral to pain MD   hydrotherapy /heat / cold for pain  continue  spinal precautions including care with bending, lifting, twisting and  carrying.  relative rest and avoidance of painful activity where possible  increasing activity as discussed  return for follow up.after labs done and imaging  she lives way up [facility] =not practical to come all this way -to transfer to Dr Long or Dr Schwarz

## 2024-03-18 NOTE — HISTORY OF PRESENT ILLNESS
[FreeTextEntry1] :  Ms. RIVKA OWEN is a very pleasant 63 year female who seen for evaluation of R knee and bilateral thigh pain that has been ongoing for years, getting worse last 6 months without any specific injury or inciting event. The pain is located primarily.   R knee, and both leg in the back intermittent in nature and described as burning, cramping, shooting, stabbing. The pain is rated as 8/10 during today's visit, and ranges from 5-10/10. The patient's symptoms are aggravated by lying down, sitting, sleeping and alleviated by massages, stretching. The patient denies any night pain, numbness/tingling, weakness, or bowel/bladder dysfunction. The patient has no other complaints at this time.  She wakes up at night, because of severe R knee pain. 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

## 2024-03-18 NOTE — REVIEW OF SYSTEMS
[Patient Intake Form Reviewed] : Patient intake form was reviewed [Fever] : no fever [Earache] : no earache [Joint Pain] : joint pain [Joint Stiffness] : joint stiffness [Muscle Pain] : muscle pain [Muscle Weakness] : muscle weakness [Dizziness] : dizziness [Difficulty Walking] : difficulty walking

## 2024-03-29 ENCOUNTER — APPOINTMENT (OUTPATIENT)
Dept: OPHTHALMOLOGY | Facility: CLINIC | Age: 64
End: 2024-03-29

## 2024-04-01 ENCOUNTER — NON-APPOINTMENT (OUTPATIENT)
Age: 64
End: 2024-04-01

## 2024-04-01 ENCOUNTER — APPOINTMENT (OUTPATIENT)
Dept: OPHTHALMOLOGY | Facility: CLINIC | Age: 64
End: 2024-04-01
Payer: COMMERCIAL

## 2024-04-01 PROCEDURE — 92133 CPTRZD OPH DX IMG PST SGM ON: CPT

## 2024-04-01 PROCEDURE — 92014 COMPRE OPH EXAM EST PT 1/>: CPT

## 2024-04-01 PROCEDURE — 92004 COMPRE OPH EXAM NEW PT 1/>: CPT

## 2024-04-03 ENCOUNTER — OUTPATIENT (OUTPATIENT)
Dept: OUTPATIENT SERVICES | Facility: HOSPITAL | Age: 64
LOS: 1 days | End: 2024-04-03
Payer: COMMERCIAL

## 2024-04-03 ENCOUNTER — RESULT REVIEW (OUTPATIENT)
Age: 64
End: 2024-04-03

## 2024-04-03 ENCOUNTER — APPOINTMENT (OUTPATIENT)
Dept: MRI IMAGING | Facility: HOSPITAL | Age: 64
End: 2024-04-03
Payer: COMMERCIAL

## 2024-04-03 PROCEDURE — 72148 MRI LUMBAR SPINE W/O DYE: CPT | Mod: 26

## 2024-04-03 PROCEDURE — 72148 MRI LUMBAR SPINE W/O DYE: CPT

## 2024-04-03 PROCEDURE — 73564 X-RAY EXAM KNEE 4 OR MORE: CPT

## 2024-04-03 PROCEDURE — 72110 X-RAY EXAM L-2 SPINE 4/>VWS: CPT

## 2024-04-03 PROCEDURE — 72110 X-RAY EXAM L-2 SPINE 4/>VWS: CPT | Mod: 26

## 2024-04-03 PROCEDURE — 73564 X-RAY EXAM KNEE 4 OR MORE: CPT | Mod: 26,RT

## 2024-04-09 ENCOUNTER — APPOINTMENT (OUTPATIENT)
Dept: PAIN MANAGEMENT | Facility: CLINIC | Age: 64
End: 2024-04-09

## 2024-04-09 VITALS
BODY MASS INDEX: 40.25 KG/M2 | OXYGEN SATURATION: 95 % | HEIGHT: 60 IN | HEART RATE: 80 BPM | SYSTOLIC BLOOD PRESSURE: 102 MMHG | DIASTOLIC BLOOD PRESSURE: 69 MMHG | WEIGHT: 205 LBS

## 2024-04-09 DIAGNOSIS — G89.29 PAIN IN RIGHT KNEE: ICD-10-CM

## 2024-04-09 DIAGNOSIS — M25.561 PAIN IN RIGHT KNEE: ICD-10-CM

## 2024-04-09 PROCEDURE — XXXXX: CPT | Mod: 1L

## 2024-05-29 ENCOUNTER — TRANSCRIPTION ENCOUNTER (OUTPATIENT)
Age: 64
End: 2024-05-29

## 2024-05-29 RX ORDER — ATORVASTATIN CALCIUM 20 MG/1
20 TABLET, FILM COATED ORAL
Qty: 1 | Refills: 3 | Status: ACTIVE | COMMUNITY
Start: 2023-03-06 | End: 1900-01-01

## 2024-05-29 RX ORDER — SEMAGLUTIDE 1.34 MG/ML
4 INJECTION, SOLUTION SUBCUTANEOUS
Qty: 3 | Refills: 1 | Status: ACTIVE | COMMUNITY
Start: 2023-08-09 | End: 1900-01-01

## 2024-09-23 ENCOUNTER — APPOINTMENT (OUTPATIENT)
Dept: OBGYN | Facility: CLINIC | Age: 64
End: 2024-09-23

## 2024-09-23 VITALS
BODY MASS INDEX: 39.84 KG/M2 | HEART RATE: 76 BPM | WEIGHT: 204 LBS | DIASTOLIC BLOOD PRESSURE: 77 MMHG | OXYGEN SATURATION: 95 % | SYSTOLIC BLOOD PRESSURE: 125 MMHG

## 2024-09-23 DIAGNOSIS — Z12.11 ENCOUNTER FOR SCREENING FOR MALIGNANT NEOPLASM OF COLON: ICD-10-CM

## 2024-09-23 PROCEDURE — 99459 PELVIC EXAMINATION: CPT

## 2024-09-23 PROCEDURE — 99396 PREV VISIT EST AGE 40-64: CPT | Mod: 25

## 2024-09-23 PROCEDURE — 56605 BIOPSY OF VULVA/PERINEUM: CPT

## 2024-09-23 RX ORDER — CLOBETASOL PROPIONATE 0.5 MG/G
0.05 OINTMENT TOPICAL
Qty: 1 | Refills: 2 | Status: ACTIVE | COMMUNITY
Start: 2024-09-23 | End: 1900-01-01

## 2024-09-23 NOTE — HISTORY OF PRESENT ILLNESS
[FreeTextEntry1] : Ms. White, Carmencita 63yo presents for an annual exam. [Patient reported mammogram was normal] : Patient reported mammogram was normal [Patient reported breast sonogram was normal] : Patient reported breast sonogram was normal [Patient reported PAP Smear was normal] : Patient reported PAP Smear was normal [postmenopausal] : postmenopausal [Mammogramdate] : 4/21/2023 [BreastSonogramDate] : 4/21/2023 [PapSmeardate] : 2/13/2023 [TextBox_31] : HPV not detected [ColonoscopyDate] : 4/7/2023 [Currently In Menopause] : currently in menopause [Post-Menopause, No Sxs] : post-menopausal, currently without symptoms

## 2024-09-25 LAB — HPV HIGH+LOW RISK DNA PNL CVX: NOT DETECTED

## 2024-09-27 LAB — CYTOLOGY CVX/VAG DOC THIN PREP: NORMAL

## 2024-10-01 LAB — CORE LAB BIOPSY: NORMAL

## 2024-11-19 ENCOUNTER — NON-APPOINTMENT (OUTPATIENT)
Age: 64
End: 2024-11-19

## 2024-11-20 ENCOUNTER — NON-APPOINTMENT (OUTPATIENT)
Age: 64
End: 2024-11-20

## 2024-12-02 ENCOUNTER — APPOINTMENT (OUTPATIENT)
Dept: OBGYN | Facility: CLINIC | Age: 64
End: 2024-12-02

## 2024-12-02 VITALS
WEIGHT: 204 LBS | HEART RATE: 72 BPM | SYSTOLIC BLOOD PRESSURE: 109 MMHG | BODY MASS INDEX: 39.84 KG/M2 | OXYGEN SATURATION: 97 % | DIASTOLIC BLOOD PRESSURE: 57 MMHG

## 2024-12-02 PROCEDURE — 99213 OFFICE O/P EST LOW 20 MIN: CPT

## 2024-12-02 PROCEDURE — 99459 PELVIC EXAMINATION: CPT

## 2025-01-17 ENCOUNTER — NON-APPOINTMENT (OUTPATIENT)
Age: 65
End: 2025-01-17

## 2025-01-17 ENCOUNTER — APPOINTMENT (OUTPATIENT)
Dept: OBGYN | Facility: CLINIC | Age: 65
End: 2025-01-17

## 2025-01-17 PROCEDURE — 99213 OFFICE O/P EST LOW 20 MIN: CPT

## 2025-01-17 PROCEDURE — 99459 PELVIC EXAMINATION: CPT

## 2025-01-23 ENCOUNTER — APPOINTMENT (OUTPATIENT)
Dept: INTERNAL MEDICINE | Facility: CLINIC | Age: 65
End: 2025-01-23
Payer: COMMERCIAL

## 2025-01-23 VITALS
BODY MASS INDEX: 38.56 KG/M2 | SYSTOLIC BLOOD PRESSURE: 119 MMHG | TEMPERATURE: 97.3 F | OXYGEN SATURATION: 96 % | HEIGHT: 60 IN | HEART RATE: 83 BPM | WEIGHT: 196.38 LBS | DIASTOLIC BLOOD PRESSURE: 76 MMHG

## 2025-01-23 DIAGNOSIS — F41.9 ANXIETY DISORDER, UNSPECIFIED: ICD-10-CM

## 2025-01-23 DIAGNOSIS — E11.9 TYPE 2 DIABETES MELLITUS W/OUT COMPLICATIONS: ICD-10-CM

## 2025-01-23 DIAGNOSIS — M54.50 LOW BACK PAIN, UNSPECIFIED: ICD-10-CM

## 2025-01-23 DIAGNOSIS — F17.200 NICOTINE DEPENDENCE, UNSPECIFIED, UNCOMPLICATED: ICD-10-CM

## 2025-01-23 DIAGNOSIS — G89.29 LOW BACK PAIN, UNSPECIFIED: ICD-10-CM

## 2025-01-23 DIAGNOSIS — F32.A ANXIETY DISORDER, UNSPECIFIED: ICD-10-CM

## 2025-01-23 DIAGNOSIS — J44.9 CHRONIC OBSTRUCTIVE PULMONARY DISEASE, UNSPECIFIED: ICD-10-CM

## 2025-01-23 DIAGNOSIS — R19.00 INTRA-ABDOMINAL AND PELVIC SWELLING, MASS AND LUMP, UNSPECIFIED SITE: ICD-10-CM

## 2025-01-23 PROCEDURE — 99406 BEHAV CHNG SMOKING 3-10 MIN: CPT | Mod: NC

## 2025-01-23 PROCEDURE — 99213 OFFICE O/P EST LOW 20 MIN: CPT | Mod: 25

## 2025-01-23 PROCEDURE — 90480 ADMN SARSCOV2 VAC 1/ONLY CMP: CPT

## 2025-01-23 PROCEDURE — 91320 SARSCV2 VAC 30MCG TRS-SUC IM: CPT

## 2025-01-23 PROCEDURE — 99396 PREV VISIT EST AGE 40-64: CPT | Mod: 25

## 2025-01-23 PROCEDURE — G0296 VISIT TO DETERM LDCT ELIG: CPT | Mod: NC

## 2025-01-23 PROCEDURE — 36415 COLL VENOUS BLD VENIPUNCTURE: CPT

## 2025-01-23 PROCEDURE — 90656 IIV3 VACC NO PRSV 0.5 ML IM: CPT

## 2025-01-23 PROCEDURE — G0008: CPT

## 2025-01-23 NOTE — ASSESSMENT
[FreeTextEntry1] : #HCM - discussed healthy diet and exercise - discussed age appropriate cancer screenings and vaccines - Flu and COVID booster today - labs drawn in office today.

## 2025-01-23 NOTE — HEALTH RISK ASSESSMENT
[Current] : Current [20 or more] : 20 or more [No] : In the past 12 months have you used drugs other than those required for medical reasons? No [Fully functional (bathing, dressing, toileting, transferring, walking, feeding)] : Fully functional (bathing, dressing, toileting, transferring, walking, feeding) [Fully functional (using the telephone, shopping, preparing meals, housekeeping, doing laundry, using] : Fully functional and needs no help or supervision to perform IADLs (using the telephone, shopping, preparing meals, housekeeping, doing laundry, using transportation, managing medications and managing finances) [de-identified] : limited by sciatica  [de-identified] : mix of health and unhealthy foods  [Reports changes in hearing] : Reports no changes in hearing [Reports changes in vision] : Reports no changes in vision [Reports changes in dental health] : Reports no changes in dental health

## 2025-01-23 NOTE — HISTORY OF PRESENT ILLNESS
[de-identified] : Ms. Gisselle Mota is a 64-year-old female smoker with history of Diabetes, Asthma/COPD, HLD, and obesity presenting today for CPE. Increased stress and tension headaches.

## 2025-01-23 NOTE — PHYSICAL EXAM
[FreeTextEntry1] : Simple vasovagal by history however cannot exclude more concerning cause persistent worsened dyspnea on exertion, even minimal recent nl BNP and CBC past PE when immobile form vertebral fx off anticoagulation as per hematology,  EKG unchanged BP ok looks well at rest syncope vasovagal like by history  [No Acute Distress] : no acute distress [Well-Appearing] : well-appearing [No Edema] : there was no peripheral edema [No Rash] : no rash [Coordination Grossly Intact] : coordination grossly intact [Normal] : affect was normal and insight and judgment were intact

## 2025-01-23 NOTE — COUNSELING
[ - Annual Lung Cancer Screening/Share Decision Making Discussion] : Annual Lung Cancer Screening/Share Decision Making Discussion. (I have advised this patient to have a Low Dose CT (LDCT) scan of the lungs and have discussed the following with the patient in a shared decision making discussion:   Benefits of Detection and Early Treatment: There is adequate evidence that annual screening for lung cancer with LDCT in a population of high-risk persons can prevent a substantial number of lung cancer-related deaths. The magnitude of benefit depends on the individual patient's risk for lung cancer, as those who are at highest risk are most likely to benefit. Screening cannot prevent most lung cancer-related deaths, and does not replace smoking cessation. Harms of Detection and Early Intervention and Treatment: The harms associated with LDCT screening include false-negative and false-positive results, incidental findings, over diagnosis, and radiation exposure. False-positive LDCT results occur in a substantial proportion of screened persons; 95% of all positive results do not lead to a diagnosis of cancer. In a high-quality screening program, further imaging can resolve most false-positive results; however, some patients may require invasive procedures. Radiation harms, including cancer resulting from cumulative exposure to radiation, vary depending on the age at the start of screening; the number of scans received; and the person's exposure to other sources of radiation, particularly other medical imaging.) [Yes] : Risk of tobacco use and health benefits of smoking cessation discussed: Yes [Cessation strategies including cessation program discussed] : Cessation strategies including cessation program discussed [Use of nicotine replacement therapies and other medications discussed] : Use of nicotine replacement therapies and other medications discussed [Encouraged to pick a quit date and identify support needed to quit] : Encouraged to pick a quit date and identify support needed to quit [FreeTextEntry1] : 5

## 2025-01-24 LAB
ALBUMIN SERPL ELPH-MCNC: 4 G/DL
ALP BLD-CCNC: 69 U/L
ALT SERPL-CCNC: 15 U/L
ANION GAP SERPL CALC-SCNC: 9 MMOL/L
AST SERPL-CCNC: 11 U/L
BASOPHILS # BLD AUTO: 0.06 K/UL
BASOPHILS NFR BLD AUTO: 0.8 %
BILIRUB SERPL-MCNC: <0.2 MG/DL
BUN SERPL-MCNC: 17 MG/DL
CALCIUM SERPL-MCNC: 9.9 MG/DL
CHLORIDE SERPL-SCNC: 106 MMOL/L
CHOLEST SERPL-MCNC: 207 MG/DL
CO2 SERPL-SCNC: 25 MMOL/L
CREAT SERPL-MCNC: 0.86 MG/DL
CREAT SPEC-SCNC: 156 MG/DL
EGFR: 76 ML/MIN/1.73M2
EOSINOPHIL # BLD AUTO: 0.14 K/UL
EOSINOPHIL NFR BLD AUTO: 1.9 %
ESTIMATED AVERAGE GLUCOSE: 123 MG/DL
GLUCOSE SERPL-MCNC: 104 MG/DL
HBA1C MFR BLD HPLC: 5.9 %
HCT VFR BLD CALC: 42.5 %
HDLC SERPL-MCNC: 42 MG/DL
HGB BLD-MCNC: 13.5 G/DL
IMM GRANULOCYTES NFR BLD AUTO: 0.4 %
LDLC SERPL CALC-MCNC: 126 MG/DL
LYMPHOCYTES # BLD AUTO: 2.66 K/UL
LYMPHOCYTES NFR BLD AUTO: 36 %
MAN DIFF?: NORMAL
MCHC RBC-ENTMCNC: 30.7 PG
MCHC RBC-ENTMCNC: 31.8 G/DL
MCV RBC AUTO: 96.6 FL
MICROALBUMIN 24H UR DL<=1MG/L-MCNC: 2.1 MG/DL
MICROALBUMIN/CREAT 24H UR-RTO: 13 MG/G
MONOCYTES # BLD AUTO: 0.58 K/UL
MONOCYTES NFR BLD AUTO: 7.9 %
NEUTROPHILS # BLD AUTO: 3.91 K/UL
NEUTROPHILS NFR BLD AUTO: 53 %
NONHDLC SERPL-MCNC: 165 MG/DL
PLATELET # BLD AUTO: 282 K/UL
POTASSIUM SERPL-SCNC: 4.5 MMOL/L
PROT SERPL-MCNC: 7 G/DL
RBC # BLD: 4.4 M/UL
RBC # FLD: 14 %
SODIUM SERPL-SCNC: 140 MMOL/L
TRIGL SERPL-MCNC: 219 MG/DL
WBC # FLD AUTO: 7.38 K/UL

## 2025-02-06 ENCOUNTER — APPOINTMENT (OUTPATIENT)
Dept: PAIN MANAGEMENT | Facility: CLINIC | Age: 65
End: 2025-02-06

## 2025-02-06 VITALS
RESPIRATION RATE: 16 BRPM | SYSTOLIC BLOOD PRESSURE: 103 MMHG | DIASTOLIC BLOOD PRESSURE: 65 MMHG | BODY MASS INDEX: 38.48 KG/M2 | HEART RATE: 83 BPM | WEIGHT: 196 LBS | OXYGEN SATURATION: 95 % | HEIGHT: 60 IN

## 2025-02-06 DIAGNOSIS — M54.16 RADICULOPATHY, LUMBAR REGION: ICD-10-CM

## 2025-02-06 PROCEDURE — 99204 OFFICE O/P NEW MOD 45 MIN: CPT

## 2025-02-06 RX ORDER — PREGABALIN 50 MG/1
50 CAPSULE ORAL
Qty: 90 | Refills: 0 | Status: ACTIVE | COMMUNITY
Start: 2025-02-06 | End: 1900-01-01

## 2025-02-06 NOTE — HISTORY OF PRESENT ILLNESS
[FreeTextEntry1] : Patient is a 64-year-old female smoker with history of Diabetes, Asthma/COPD, HLD, and obesity presenting for follow up today for exacerbation of her low back pain. She reports trying to lift her mother last week and felt a "cracking" of her back. Since then she reports having significant pain.  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 Two days ago, while attempting to lift her mother from the floor, she experienced a sudden onset of severe low back pain accompanied by a cracking sound. The pain is localized to the center of her lower back and has persisted since the incident. She describes the pain as worse with walking, standing, lifting, breathing, eating, and coughing. The patient reports a history of chronic back pain and sciatica, primarily affecting her left leg, which she has managed for years. She mentions feeling like water is gushing down her left leg during daily activities on the lateral left side and anterior thigh. The new pain in the center of her low back is distinct from her usual symptoms and is causing significant distress. She has been taking Motrin 200mg at night to help with sleep  [Back Pain] : back pain [___ days] : [unfilled] day(s) ago [9] : a current pain level of 9/10

## 2025-02-06 NOTE — ASSESSMENT
[FreeTextEntry1] :  Acute exacerbation of chronic low back pain: The patient presents with an acute worsening of chronic low back pain following a lifting incident. The new central back pain and associated symptoms suggest a possible disc herniation or muscle strain.     - Imaging: Order MRI of the lumbar spine and X-rays (flexion and extension views) to assess for disc herniation or other structural changes     - Medications: Continue Motrin 200mg as needed for pain relief     - Patient Education: Discuss proper body mechanics for lifting and transferring her mother     - Referrals: Consider referral to physical therapy for back strengthening exercises and proper lifting techniques     - Follow-up: Schedule follow-up appointment after imaging results are available to discuss findings and further management   - Chronic sciatica: Patient reports ongoing sciatica symptoms primarily affecting the left leg, which have been present for years.     - Medications: Consider prescribing a short course of oral steroids if no contraindications     - Referrals: Discuss the option of pain management referral for possible steroid injections     - Patient Education: Provide information on home exercises and stretches for sciatica relief     - Follow-up: Reassess sciatica symptoms at next appointment and adjust treatment plan as needed   - Caregiver stress: Patient expresses difficulty in managing her mother's care, which is impacting her own health.     - Referrals: Provide information on respite care services and support groups for caregivers     - Patient Education: Discuss the importance of self-care and stress management techniques     - Follow-up: Assess the patient's stress levels and coping strategies at subsequent visits

## 2025-02-06 NOTE — PHYSICAL EXAM
[Normal] : Regular, no tachycardia [Normal muscle bulk without asymmetry] : normal muscle bulk without asymmetry [Spinous Process Tenderness] : spinous process tenderness [Facet Tenderness] : facet tenderness [Antalgic] : antalgic [SLR] : positive straight leg raise [Meneses's Test] : positive Meneses's Test [LE] : Sensory: Intact in bilateral lower extremities [de-identified] : limited ROM on extension

## 2025-02-19 ENCOUNTER — RESULT REVIEW (OUTPATIENT)
Age: 65
End: 2025-02-19

## 2025-02-19 ENCOUNTER — OUTPATIENT (OUTPATIENT)
Dept: OUTPATIENT SERVICES | Facility: HOSPITAL | Age: 65
LOS: 1 days | End: 2025-02-19
Payer: COMMERCIAL

## 2025-02-19 ENCOUNTER — APPOINTMENT (OUTPATIENT)
Dept: PULMONOLOGY | Facility: CLINIC | Age: 65
End: 2025-02-19
Payer: COMMERCIAL

## 2025-02-19 VITALS
HEIGHT: 60 IN | RESPIRATION RATE: 14 BRPM | TEMPERATURE: 97.5 F | DIASTOLIC BLOOD PRESSURE: 71 MMHG | BODY MASS INDEX: 37.3 KG/M2 | OXYGEN SATURATION: 97 % | WEIGHT: 190 LBS | HEART RATE: 95 BPM | SYSTOLIC BLOOD PRESSURE: 101 MMHG

## 2025-02-19 DIAGNOSIS — J44.9 CHRONIC OBSTRUCTIVE PULMONARY DISEASE, UNSPECIFIED: ICD-10-CM

## 2025-02-19 DIAGNOSIS — F17.200 NICOTINE DEPENDENCE, UNSPECIFIED, UNCOMPLICATED: ICD-10-CM

## 2025-02-19 PROCEDURE — G2211 COMPLEX E/M VISIT ADD ON: CPT | Mod: NC

## 2025-02-19 PROCEDURE — 72110 X-RAY EXAM L-2 SPINE 4/>VWS: CPT

## 2025-02-19 PROCEDURE — 72110 X-RAY EXAM L-2 SPINE 4/>VWS: CPT | Mod: 26

## 2025-02-19 PROCEDURE — 99203 OFFICE O/P NEW LOW 30 MIN: CPT

## 2025-02-19 RX ORDER — NICOTINE POLACRILEX 2 MG/1
2 LOZENGE ORAL
Qty: 60 | Refills: 1 | Status: ACTIVE | COMMUNITY
Start: 2025-02-19 | End: 1900-01-01

## 2025-02-19 RX ORDER — NICOTINE 21 MG/24HR
14 PATCH, TRANSDERMAL 24 HOURS TRANSDERMAL DAILY
Qty: 30 | Refills: 1 | Status: ACTIVE | COMMUNITY
Start: 2025-02-19 | End: 1900-01-01

## 2025-02-21 NOTE — HISTORY OF PRESENT ILLNESS
[Current] : current [Never] : never [TextBox_4] : 64yF with DM2, HLD, headaches. Asthma as a child, presumed has COPD  Triggers: viral URi, dog dander and cat dander allergy Pulm meds: albuterol as needed Uses albuterol 2-3 times in last yr  No shortness of breath at present Dyspnea on exertion when came from [facility] to [facility] initially but now not dyspneic, regained the stamina - can climb 4 flights of stairs  No Chest pain Wheezing intermittently  Has a tooth infection and is on abx No hospitalization for breathing issues  Family Hx: no lung ca in family, father had breathing issues- unknown origin [TextBox_11] : 1 pack per day [YearQuit] : tried patches, gum, chantix- nothing works [TextBox_13] : 50+ yrs

## 2025-02-21 NOTE — ASSESSMENT
[FreeTextEntry1] : # History of asthma, presumed COPD # Tobacco use disorder   Patient currently is without any respiratory symptoms.  Shortness of breath on exertion has improved. Will perform PFTs with pre and postbronchodilator response to understand the extent of the obstructive lung disease Counseled tobacco smoking cessation, start nicotine patches and lozenges.  Follow-up after PFTs

## 2025-02-21 NOTE — HISTORY OF PRESENT ILLNESS
[Current] : current [Never] : never [TextBox_4] : 64yF with DM2, HLD, headaches. Asthma as a child, presumed has COPD  Triggers: viral URi, dog dander and cat dander allergy Pulm meds: albuterol as needed Uses albuterol 2-3 times in last yr  No shortness of breath at present Dyspnea on exertion when came from [facility] to [facility] initially but now not dyspneic, regained the stamina - can climb 4 flights of stairs  No Chest pain Wheezing intermittently  Has a tooth infection and is on abx No hospitalization for breathing issues  Family Hx: no lung ca in family, father had breathing issues- unknown origin [TextBox_11] : 1 pack per day [TextBox_13] : 50+ yrs [YearQuit] : tried patches, gum, chantix- nothing works

## 2025-03-05 ENCOUNTER — NON-APPOINTMENT (OUTPATIENT)
Age: 65
End: 2025-03-05

## 2025-03-05 VITALS — WEIGHT: 190 LBS | BODY MASS INDEX: 37.3 KG/M2 | HEIGHT: 60 IN

## 2025-03-05 DIAGNOSIS — F17.200 NICOTINE DEPENDENCE, UNSPECIFIED, UNCOMPLICATED: ICD-10-CM

## 2025-03-05 DIAGNOSIS — J44.9 CHRONIC OBSTRUCTIVE PULMONARY DISEASE, UNSPECIFIED: ICD-10-CM

## 2025-03-05 NOTE — HISTORY OF PRESENT ILLNESS
[Current] : Current [TextBox_13] : Referred by jas Kam   Ms. CAROL GODINEZ is a 64 year old woman with a history of nicotine dependence   Over the telephone today we reviewed and confirmed that the patient meets screening eligibility criteria:  -Age:64 years old  _SDM documented by Dr. Martínez    Smoking status: current  Number of pack years smokin   Ms. GODINEZ denies any personal history of lung cancer. Denies any s/s of lung cancer.  No lung cancer in a 1st degree relative.  Denies any history of occupational exposures.     [PacksperYear] : 50

## 2025-03-05 NOTE — REASON FOR VISIT
[Home] : at home, [unfilled] , at the time of the visit. [Medical Office: ([facility])___] : at the medical office located in  [Telephone (audio)] : This telephonic visit was provided via audio only technology. [Patient preference] : patient preference. [Verbal consent obtained from patient] : the patient, [unfilled] [Initial Evaluation] : an initial evaluation visit [Review of Eligibility] : review of eligibility [Low-Dose CT Screening Discussion] : low-dose CT lung cancer screening discussion [Virtual Visit] : virtual visit

## 2025-03-05 NOTE — PLAN
[FreeTextEntry1] : Plan:   -Low Dose CT chest for lung cancer screening   -Patient to follow up with Dr. Martínez    -Encouraged smoking cessation   Engaged in shared decision making with Ms. CAROL GODINEZ.  Answered all questions. She verbalized understanding and agreement.  She knows to call back with any questions or concerns.

## 2025-03-14 ENCOUNTER — NON-APPOINTMENT (OUTPATIENT)
Age: 65
End: 2025-03-14

## 2025-03-14 ENCOUNTER — OUTPATIENT (OUTPATIENT)
Dept: OUTPATIENT SERVICES | Facility: HOSPITAL | Age: 65
LOS: 1 days | End: 2025-03-14
Payer: COMMERCIAL

## 2025-03-14 ENCOUNTER — APPOINTMENT (OUTPATIENT)
Dept: CT IMAGING | Facility: HOSPITAL | Age: 65
End: 2025-03-14

## 2025-03-14 ENCOUNTER — APPOINTMENT (OUTPATIENT)
Dept: MRI IMAGING | Facility: HOSPITAL | Age: 65
End: 2025-03-14

## 2025-03-14 PROCEDURE — 71271 CT THORAX LUNG CANCER SCR C-: CPT | Mod: 26

## 2025-03-14 PROCEDURE — 71271 CT THORAX LUNG CANCER SCR C-: CPT

## 2025-03-14 PROCEDURE — 72148 MRI LUMBAR SPINE W/O DYE: CPT

## 2025-03-14 PROCEDURE — 72148 MRI LUMBAR SPINE W/O DYE: CPT | Mod: 26

## 2025-03-19 ENCOUNTER — APPOINTMENT (OUTPATIENT)
Dept: PULMONOLOGY | Facility: CLINIC | Age: 65
End: 2025-03-19
Payer: COMMERCIAL

## 2025-03-19 PROCEDURE — 94729 DIFFUSING CAPACITY: CPT

## 2025-03-19 PROCEDURE — 94727 GAS DIL/WSHOT DETER LNG VOL: CPT

## 2025-03-19 PROCEDURE — 94010 BREATHING CAPACITY TEST: CPT

## 2025-04-18 ENCOUNTER — APPOINTMENT (OUTPATIENT)
Dept: OBGYN | Facility: CLINIC | Age: 65
End: 2025-04-18

## 2025-04-18 VITALS
SYSTOLIC BLOOD PRESSURE: 103 MMHG | WEIGHT: 190 LBS | HEART RATE: 76 BPM | BODY MASS INDEX: 37.11 KG/M2 | OXYGEN SATURATION: 95 % | DIASTOLIC BLOOD PRESSURE: 71 MMHG

## 2025-04-18 PROCEDURE — 99459 PELVIC EXAMINATION: CPT

## 2025-04-18 PROCEDURE — 99213 OFFICE O/P EST LOW 20 MIN: CPT

## 2025-04-18 NOTE — HISTORY OF PRESENT ILLNESS
[FreeTextEntry1] : Ms. CherryGisselle 65yr. old female presents for a follow up vaginal itchiness, but denies any discharge or odor [LMP unknown] : LMP unknown [postmenopausal] : postmenopausal [N] : Patient is not sexually active [unknown] : Patient is unsure of the date of her LMP [Post-Menopause, No Sxs] : post-menopausal, currently without symptoms [Previously active] : previously active [Men] : men [No] : No [Patient refuses STI testing] : Patient refuses STI testing

## 2025-05-01 ENCOUNTER — APPOINTMENT (OUTPATIENT)
Dept: PAIN MANAGEMENT | Facility: CLINIC | Age: 65
End: 2025-05-01
Payer: COMMERCIAL

## 2025-05-01 VITALS
OXYGEN SATURATION: 96 % | BODY MASS INDEX: 37.3 KG/M2 | HEIGHT: 60 IN | HEART RATE: 71 BPM | SYSTOLIC BLOOD PRESSURE: 100 MMHG | WEIGHT: 190 LBS | DIASTOLIC BLOOD PRESSURE: 70 MMHG

## 2025-05-01 PROCEDURE — 62323 NJX INTERLAMINAR LMBR/SAC: CPT

## 2025-05-02 ENCOUNTER — NON-APPOINTMENT (OUTPATIENT)
Age: 65
End: 2025-05-02

## 2025-05-04 NOTE — PROCEDURE
[FreeTextEntry1] : Interlaminar lumbar epidural steroid injection at L4-5 The potential benefits as well as rare but possible risks were reviewed with the patient.  These risks including infection including epidural abscess, meningitis, osteomyelitis, and discitis, bleeding including epidural hematoma, nerve injury, paralysis, failure to relieve pain or worse pain, headache, pneumothorax, elevated blood sugars, allergic reactions, adverse reactions to medications, vasovagal reactions, falls, etc. Following that discussion, all questions were again answered to the patients satisfaction, the patient stated their verbal understanding and written consent was obtained.   After obtaining consent, pre-procedure blood pressure and heart rate were stable and recorded in the nursing record. Standard monitors were applied. The patient was placed in the prone position. The lumbosacral area was widely prepped with chloroprep and draped in sterile fashion. Fluoroscopic guidance was used to identify the desired interlaminar space and for needle placement. Subcutaneous 0.5% lidocaine was used to anesthetize the skin overlying the target. A 20-gauge Tuohy needle was advanced to the epidural space using loss of resistance to air technique and AP / contralateral oblique views under fluoroscopy. There was no evidence of heme or CSF and no paresthesias were elicited with needle placement.   Confirmation of epidural needle placement was performed with 0.5 cc of omnipaque Next 3 ml preservative free normal saline and 10 mg dexamethasone was administered epidurally with no pain elicited on injection. The needle was removed, skin cleansed with alcohol and a sterile bandage was applied. The patient tolerated the procedure well and no complications were encountered. Following the procedure, the patient's vital signs were stable. The patient was discharged home in good condition with post-procedural instructions.   Time Out: Immediately prior to the procedure, the following was verbally confirmed that there is a consent form and that the correct patient, planned procedure, site and side are consistent with documentation and that necessary equipment and/or blood products are available prior to the start of the case.   Complications: none EBL: <5 cc No dexamethasone wasted

## 2025-05-15 ENCOUNTER — APPOINTMENT (OUTPATIENT)
Dept: PAIN MANAGEMENT | Facility: CLINIC | Age: 65
End: 2025-05-15

## 2025-05-15 VITALS
DIASTOLIC BLOOD PRESSURE: 73 MMHG | OXYGEN SATURATION: 94 % | HEIGHT: 60 IN | HEART RATE: 77 BPM | BODY MASS INDEX: 37.3 KG/M2 | SYSTOLIC BLOOD PRESSURE: 106 MMHG | WEIGHT: 190 LBS

## 2025-05-15 DIAGNOSIS — G89.29 LOW BACK PAIN, UNSPECIFIED: ICD-10-CM

## 2025-05-15 DIAGNOSIS — M54.51 VERTEBROGENIC LOW BACK PAIN: ICD-10-CM

## 2025-05-15 DIAGNOSIS — M54.50 LOW BACK PAIN, UNSPECIFIED: ICD-10-CM

## 2025-05-15 PROCEDURE — 99215 OFFICE O/P EST HI 40 MIN: CPT

## 2025-05-15 NOTE — PHYSICAL EXAM
[Normal] : Regular, no tachycardia [Normal muscle bulk without asymmetry] : normal muscle bulk without asymmetry [Spinous Process Tenderness] : spinous process tenderness [Facet Tenderness] : facet tenderness [Antalgic] : antalgic [SLR] : positive straight leg raise [Meneses's Test] : positive Meneses's Test [LE] : Sensory: Intact in bilateral lower extremities [Patellar] : patellar 2+ and symmetric bilaterally [de-identified] : limited ROM on extension. Pain with flexion

## 2025-05-15 NOTE — HISTORY OF PRESENT ILLNESS
[FreeTextEntry1] : Patient is a 64-year-old female smoker with history of Diabetes, Asthma/COPD, HLD, and obesity presenting for follow up today for low back pain, vertebrogenic pain. She is s/p LESI L4-L5 on 5/1/25.  She recently underwent an epidural injection at L4-L5 level. The patient reports that the right side has shown some improvement, but the left side continues to be problematic. She describes persistent pain from her lower back down the left leg, not extending to the toes. The pain is exacerbated by prolonged standing, walking. She notes numbness in her thighs after standing for 10-15 minutes. She struggles with daily activities such as getting out of bed and preparing meals due to severe pain. The patient also mentions recent emotional stress due to her mother's passing.        [Back Pain] : back pain [8] : a current pain level of 8/10

## 2025-05-15 NOTE — ASSESSMENT
[FreeTextEntry1] : Patient is a 64-year-old female smoker with history of Diabetes, Asthma/COPD, HLD, and obesity presenting for follow up today for low back pain, vertebrogenic pain. She is s/p LESI L4-L5 on 5/1/25.  She recently underwent an epidural injection at L4-L5 level. The patient reports that the right side has shown some improvement, but the left side continues to be problematic. She describes persistent pain from her lower back down the left leg, not extending to the toes. The pain is exacerbated by prolonged standing, walking. She notes numbness in her thighs after standing for 10-15 minutes. She struggles with daily activities such as getting out of bed and preparing meals due to severe pain.  The patient has failed rest, activity modification, 6 weeks of physician directed physical therapy, NSAIDs, muscle relaxants, and neuropathic medications.  They have also failed acupuncture, chiropractic care, and surgical evaluation. At this point an interventional therapy targeted at alleviating their pain and improving their functionality is a reasonable treatment option.   Plan: - Recommend repeat LESI L4-L5 as patient has had some improvement from first injection. will submit auth.  The potential benefits as well as rare but possible risks were reviewed with the patient.  These risks including infection including epidural abscess, meningitis, osteomyelitis, and discitis, bleeding including epidural hematoma, nerve injury, paralysis, failure to relieve pain or worse pain, headache, pneumothorax, elevated blood sugars, allergic reactions, adverse reactions to medications, vasovagal reactions, falls, etc. Following that discussion, we decided together that the potential benefits outweigh the potential risks and we decided to proceed with scheduling the procedure.  I answered all the patient's questions about the procedure including the technical details of the procedure and also offered that if any other questions arise, we will also be happy to answer those on the day of the procedure. All questions today were answered to the patient's satisfaction, and the patient stated their verbal understanding. - Follow up for inj

## 2025-05-23 ENCOUNTER — RX RENEWAL (OUTPATIENT)
Age: 65
End: 2025-05-23

## 2025-05-27 ENCOUNTER — RX RENEWAL (OUTPATIENT)
Age: 65
End: 2025-05-27

## 2025-06-17 ENCOUNTER — APPOINTMENT (OUTPATIENT)
Dept: PAIN MANAGEMENT | Facility: CLINIC | Age: 65
End: 2025-06-17

## 2025-06-17 VITALS
HEIGHT: 60 IN | OXYGEN SATURATION: 95 % | WEIGHT: 190 LBS | DIASTOLIC BLOOD PRESSURE: 74 MMHG | HEART RATE: 85 BPM | SYSTOLIC BLOOD PRESSURE: 120 MMHG | BODY MASS INDEX: 37.3 KG/M2

## 2025-06-17 PROCEDURE — 64483 NJX AA&/STRD TFRM EPI L/S 1: CPT | Mod: LT

## 2025-06-17 PROCEDURE — 64484 NJX AA&/STRD TFRM EPI L/S EA: CPT | Mod: LT

## 2025-06-17 PROCEDURE — 99215 OFFICE O/P EST HI 40 MIN: CPT | Mod: 25

## 2025-06-17 NOTE — PROCEDURE
[FreeTextEntry1] : Transforaminal lumbar epidural steroid injection at left L3-4, L4-5 After obtaining consent, pre-procedure blood pressure and heart rate were stable and recorded in the nursing record. Standard monitors were applied. The patient was placed in the prone position on the fluoroscopy table. The lumbosacral area was prepped with chloroprep. A 25 gauge 3.5 inch spinal needle was advanced to the safe triangle in the upper pole of the each foramen. No paresthesias were elicited with needle placement and aspiration was negative for blood and CSF.   Correct needle position was confirmed with approximately 1 ml contrast dye Omnipaque injected under real-time fluoroscopy. No evidence of vascular or intrathecal uptake was seen and there was both epidural and peripheral spread of the contrast agent. 5 mg dexamethasone plus 1.5 ml  0.5% lidocaine,  was slowly injected. The needle was removed. The same procedure was performed at the remaining nerve roots. The skin was cleansed and a sterile bandage was applied. The patient tolerated the procedure well and no complications were encountered. Following the procedure the patient's vital signs were stable. The patient was discharged home in good condition with post-procedural instructions.  Time Out: Immediately prior to the procedure, the following was verbally confirmed that there is a consent form and that the correct patient, planned procedure, site and side are consistent with documentation and that necessary equipment and/or blood products are available prior to the start of the case.  Total Dexamethasone 10 mg, Total Lidocaine 0.5% 3 ml  Complications: none EBL: <5 cc. No steroid wasted

## 2025-07-15 ENCOUNTER — APPOINTMENT (OUTPATIENT)
Dept: PAIN MANAGEMENT | Facility: CLINIC | Age: 65
End: 2025-07-15

## 2025-07-15 VITALS
OXYGEN SATURATION: 95 % | WEIGHT: 190 LBS | DIASTOLIC BLOOD PRESSURE: 71 MMHG | BODY MASS INDEX: 37.3 KG/M2 | SYSTOLIC BLOOD PRESSURE: 138 MMHG | HEIGHT: 60 IN | HEART RATE: 74 BPM

## 2025-07-15 PROCEDURE — 99215 OFFICE O/P EST HI 40 MIN: CPT

## 2025-07-24 NOTE — ASSESSMENT
[FreeTextEntry1] : Patient is a 65-year-old female smoker (1 pack/day) with history of Diabetes, Asthma/COPD, HLD, and obesity presenting for follow up today for low back pain, vertebrogenic pain. She is s/p left transforaminal L3-4 and L4-5 on 6/17/25.  Epidural provided relief of her radicular pain.   She continues to feel low back pain with sitting, climbing stairs, and forward bending. The pain is improved once standing up. The patient is skeletally mature and has failed to respond to 6 months of supervised conservative management (including exercise, nonsteroidal and steroidal medications, multiple courses of physical therapy including both passive and active modalities, lifestyle modification). The pain exhibited is consistent with Vertebrogenic Pain, there is evidence of (Type 2 Modic changes at L4, L5, S1). I have confirmed this myself on the MRI images in additional to a read by radiology. The patient has not had previous spine surgery and has not had previous basivertebral nerve ablation. There is no evidence on MRI or flexion extension radiographs to suggest that this back pain is due to lumbar stenosis, degenerative scoliosis, spondylolisthesis, segmental instability, facet arthropathy and disc disease. The patient does not have lumbar radicular pain. The patient does not have evidence of metabolic bone disease as evidenced by a T score on a recent bone density scan.  The patient has failed rest, activity modification, 6 weeks of physician directed physical therapy, NSAIDs, muscle relaxants, and neuropathic medications. They have also failed acupuncture, chiropractic care, and surgical evaluation. At this point an interventional therapy targeted at alleviating their pain and improving their functionality is a reasonable treatment option.   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     Clinical symptoms:  - predominantly mechanical, axial CLBP for several years duration  - primarily nociceptive CLBP in nature, with limited or without neuropathic features  - moderate to severe symptoms present for at least >50% of days within the past six months  - no radicular symptoms, no referral of pain distal to the knee to suggest active lumbar radiculopathy  - pain is mechanical, mainly movement or position related, aggravated by trivial activities and small movement tasks (washing dishes, leaning over sink, brushing teeth, lower extremity dressing, etc)  - pain pattern is persistent with sustained postures, such as prolonged sitting, standing, driving, particularly aggravated without a back support  - of note, the above-described clinical symptoms have been proven to correlate with multifidus dysfunction and/or atrophy and CLBP of axial mechanical nature     Physical Exam:  - physical exam revealed a (+) positive prone instability test (PIT), (+) multifidus lift test (MLT), and aberrant movement pattern on screening exam. These exam maneuvers have demonstrated sustained interrater reliability to support this diagnosis.     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 The pain has significant impact on the patient's function and daily quality of life.  - the patient is not a candidate to spinal surgery, since there is no structural pathology amenable to surgery on diagnostic imaging  - there are no other active implantable medical devices, no comorbid pain conditions that are a contraindication to the procedure        The plan is to proceed with:  - Peripheral nerve restorative neurostimulation (PNS, ReActiv8 system) of the dorsal rami of the L2 lumbar medial branch nerve (LMBN)   A. Restorative neurostimulation (ReActiv8 system) is the only Food and Drug Administration (FDA) approved PNS device to treat CLBP associated with multifidus atrophy and/or dysfunction.  B.  Several high-quality clinical studies (randomized clinical trials prospective studies) have been published reporting long-term durable improvements (up to four years with available data) in pain, disability, better quality of life, and individual studies also reporting decreased opioid use and health care utilization reduction with restorative neurostimulation (PNS, ReActiv8 system) of the lumbar medial branch nerve.  C.   Medical societies guidelines (national and international), systematic-reviews and the National [place] of Healthcare Excellence (NICE) summarize that there is high-quality level I, grade A evidence with well-designed, well-conducted, multi-center randomized, sham, controlled clinical trials and moderate-quality level II, grade B evidence supporting long-term durable effects of restorative neurostimulation for CLBP associated with multifidus dysfunction.  Plan: -Prescription for Lyrica 50mg TID renewed at today's visit -Recommend ReActiv8 (information and brochures provided to patient) Will need medical clearance from pulmonolgist and PCP, and psych eval Pending Authorization - Recommend Intracept L4, L5, S1 Pending authorization

## 2025-07-24 NOTE — REVIEW OF SYSTEMS
[Negative] : Cardiovascular [FreeTextEntry6] : Smoker 1 pack/day [FreeTextEntry9] : See HPI [de-identified] : See HPI

## 2025-07-24 NOTE — PHYSICAL EXAM
[Normal] : Non-labored breathing, no audible wheezes [Paraspinal Tenderness] : paraspinal tenderness [Meneses's Test] : positive Meneses's Test [] : Motor: [2+] : left ankle jerk 2+ [Spurling] : negative Spurling's Test [SLR] : negative straight leg raise [de-identified] : Difficulty rising from a chair [de-identified] : Full lumbar ROM with end range pain

## 2025-07-24 NOTE — ASSESSMENT
[FreeTextEntry1] : Patient is a 65-year-old female smoker (1 pack/day) with history of Diabetes, Asthma/COPD, HLD, and obesity presenting for follow up today for low back pain, vertebrogenic pain. She is s/p left transforaminal L3-4 and L4-5 on 6/17/25.  Epidural provided relief of her radicular pain.   She continues to feel low back pain with sitting, climbing stairs, and forward bending. The pain is improved once standing up. The patient is skeletally mature and has failed to respond to 6 months of supervised conservative management (including exercise, nonsteroidal and steroidal medications, multiple courses of physical therapy including both passive and active modalities, lifestyle modification). The pain exhibited is consistent with Vertebrogenic Pain, there is evidence of (Type 2 Modic changes at L4, L5, S1). I have confirmed this myself on the MRI images in additional to a read by radiology. The patient has not had previous spine surgery and has not had previous basivertebral nerve ablation. There is no evidence on MRI or flexion extension radiographs to suggest that this back pain is due to lumbar stenosis, degenerative scoliosis, spondylolisthesis, segmental instability, facet arthropathy and disc disease. The patient does not have lumbar radicular pain. The patient does not have evidence of metabolic bone disease as evidenced by a T score on a recent bone density scan.  The patient has failed rest, activity modification, 6 weeks of physician directed physical therapy, NSAIDs, muscle relaxants, and neuropathic medications. They have also failed acupuncture, chiropractic care, and surgical evaluation. At this point an interventional therapy targeted at alleviating their pain and improving their functionality is a reasonable treatment option.   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     Clinical symptoms:  - predominantly mechanical, axial CLBP for several years duration  - primarily nociceptive CLBP in nature, with limited or without neuropathic features  - moderate to severe symptoms present for at least >50% of days within the past six months  - no radicular symptoms, no referral of pain distal to the knee to suggest active lumbar radiculopathy  - pain is mechanical, mainly movement or position related, aggravated by trivial activities and small movement tasks (washing dishes, leaning over sink, brushing teeth, lower extremity dressing, etc)  - pain pattern is persistent with sustained postures, such as prolonged sitting, standing, driving, particularly aggravated without a back support  - of note, the above-described clinical symptoms have been proven to correlate with multifidus dysfunction and/or atrophy and CLBP of axial mechanical nature     Physical Exam:  - physical exam revealed a (+) positive prone instability test (PIT), (+) multifidus lift test (MLT), and aberrant movement pattern on screening exam. These exam maneuvers have demonstrated sustained interrater reliability to support this diagnosis.     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 The pain has significant impact on the patient's function and daily quality of life.  - the patient is not a candidate to spinal surgery, since there is no structural pathology amenable to surgery on diagnostic imaging  - there are no other active implantable medical devices, no comorbid pain conditions that are a contraindication to the procedure        The plan is to proceed with:  - Peripheral nerve restorative neurostimulation (PNS, ReActiv8 system) of the dorsal rami of the L2 lumbar medial branch nerve (LMBN)   A. Restorative neurostimulation (ReActiv8 system) is the only Food and Drug Administration (FDA) approved PNS device to treat CLBP associated with multifidus atrophy and/or dysfunction.  B.  Several high-quality clinical studies (randomized clinical trials prospective studies) have been published reporting long-term durable improvements (up to four years with available data) in pain, disability, better quality of life, and individual studies also reporting decreased opioid use and health care utilization reduction with restorative neurostimulation (PNS, ReActiv8 system) of the lumbar medial branch nerve.  C.   Medical societies guidelines (national and international), systematic-reviews and the National [place] [employer] Healthcare Excellence (NICE) summarize that there is high-quality level I, grade A evidence with well-designed, well-conducted, multi-center randomized, sham, controlled clinical trials and moderate-quality level II, grade B evidence supporting long-term durable effects of restorative neurostimulation for CLBP associated with multifidus dysfunction.  Plan: -Prescription for Lyrica 50mg TID renewed at today's visit -Recommend ReActiv8 (information and brochures provided to patient) Will need medical clearance from pulmonolgist and PCP, and psych eval Pending Authorization - Recommend Intracept L4, L5, S1 Pending authorization

## 2025-07-24 NOTE — HISTORY OF PRESENT ILLNESS
[FreeTextEntry1] : Patient is a 65-year-old female smoker (1 pack/day) with history of Diabetes, Asthma/COPD, HLD, and obesity presenting for follow up today for low back pain, vertebrogenic pain. She is s/p left transforaminal L3-4 and L4-5 on 6/17/25.  She reports 80% improvement in her left sided radicular pain.  She is able to carry out ADLs better.  She continues to have R>L axial back pain.  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  She would also like a renewal for Lyrica.

## 2025-07-24 NOTE — ASSESSMENT
[FreeTextEntry1] : Patient is a 65-year-old female smoker (1 pack/day) with history of Diabetes, Asthma/COPD, HLD, and obesity presenting for follow up today for low back pain, vertebrogenic pain. She is s/p left transforaminal L3-4 and L4-5 on 6/17/25.  Epidural provided relief of her radicular pain.   She continues to feel low back pain with sitting, climbing stairs, and forward bending. The pain is improved once standing up. The patient is skeletally mature and has failed to respond to 6 months of supervised conservative management (including exercise, nonsteroidal and steroidal medications, multiple courses of physical therapy including both passive and active modalities, lifestyle modification). The pain exhibited is consistent with Vertebrogenic Pain, there is evidence of (Type 2 Modic changes at L4, L5, S1). I have confirmed this myself on the MRI images in additional to a read by radiology. The patient has not had previous spine surgery and has not had previous basivertebral nerve ablation. There is no evidence on MRI or flexion extension radiographs to suggest that this back pain is due to lumbar stenosis, degenerative scoliosis, spondylolisthesis, segmental instability, facet arthropathy and disc disease. The patient does not have lumbar radicular pain. The patient does not have evidence of metabolic bone disease as evidenced by a T score on a recent bone density scan.  The patient has failed rest, activity modification, 6 weeks of physician directed physical therapy, NSAIDs, muscle relaxants, and neuropathic medications. They have also failed acupuncture, chiropractic care, and surgical evaluation. At this point an interventional therapy targeted at alleviating their pain and improving their functionality is a reasonable treatment option.   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     Clinical symptoms:  - predominantly mechanical, axial CLBP for several years duration  - primarily nociceptive CLBP in nature, with limited or without neuropathic features  - moderate to severe symptoms present for at least >50% of days within the past six months  - no radicular symptoms, no referral of pain distal to the knee to suggest active lumbar radiculopathy  - pain is mechanical, mainly movement or position related, aggravated by trivial activities and small movement tasks (washing dishes, leaning over sink, brushing teeth, lower extremity dressing, etc)  - pain pattern is persistent with sustained postures, such as prolonged sitting, standing, driving, particularly aggravated without a back support  - of note, the above-described clinical symptoms have been proven to correlate with multifidus dysfunction and/or atrophy and CLBP of axial mechanical nature     Physical Exam:  - physical exam revealed a (+) positive prone instability test (PIT), (+) multifidus lift test (MLT), and aberrant movement pattern on screening exam. These exam maneuvers have demonstrated sustained interrater reliability to support this diagnosis.     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 The pain has significant impact on the patient's function and daily quality of life.  - the patient is not a candidate to spinal surgery, since there is no structural pathology amenable to surgery on diagnostic imaging  - there are no other active implantable medical devices, no comorbid pain conditions that are a contraindication to the procedure        The plan is to proceed with:  - Peripheral nerve restorative neurostimulation (PNS, ReActiv8 system) of the dorsal rami of the L2 lumbar medial branch nerve (LMBN)   A. Restorative neurostimulation (ReActiv8 system) is the only Food and Drug Administration (FDA) approved PNS device to treat CLBP associated with multifidus atrophy and/or dysfunction.  B.  Several high-quality clinical studies (randomized clinical trials prospective studies) have been published reporting long-term durable improvements (up to four years with available data) in pain, disability, better quality of life, and individual studies also reporting decreased opioid use and health care utilization reduction with restorative neurostimulation (PNS, ReActiv8 system) of the lumbar medial branch nerve.  C.   Medical societies guidelines (national and international), systematic-reviews and the [employer] [place] [employer] Healthcare Excellence (NICE) summarize that there is high-quality level I, grade A evidence with well-designed, well-conducted, multi-center randomized, sham, controlled clinical trials and moderate-quality level II, grade B evidence supporting long-term durable effects of restorative neurostimulation for CLBP associated with multifidus dysfunction.  Plan: -Prescription for Lyrica 50mg TID renewed at today's visit -Recommend ReActiv8 (information and brochures provided to patient) Will need medical clearance from pulmonolgist and PCP, and psych eval Pending Authorization - Recommend Intracept L4, L5, S1 Pending authorization

## 2025-07-24 NOTE — REVIEW OF SYSTEMS
[Negative] : Cardiovascular [FreeTextEntry6] : Smoker 1 pack/day [FreeTextEntry9] : See HPI [de-identified] : See HPI

## 2025-07-24 NOTE — PHYSICAL EXAM
[Normal] : Non-labored breathing, no audible wheezes [Paraspinal Tenderness] : paraspinal tenderness [Meneses's Test] : positive Meneses's Test [] : Motor: [2+] : left ankle jerk 2+ [Spurling] : negative Spurling's Test [SLR] : negative straight leg raise [de-identified] : Difficulty rising from a chair [de-identified] : Full lumbar ROM with end range pain

## 2025-07-24 NOTE — REVIEW OF SYSTEMS
[Negative] : Cardiovascular [FreeTextEntry6] : Smoker 1 pack/day [FreeTextEntry9] : See HPI [de-identified] : See HPI

## 2025-07-31 ENCOUNTER — APPOINTMENT (OUTPATIENT)
Dept: INTERNAL MEDICINE | Facility: CLINIC | Age: 65
End: 2025-07-31
Payer: COMMERCIAL

## 2025-07-31 VITALS
DIASTOLIC BLOOD PRESSURE: 74 MMHG | HEART RATE: 74 BPM | WEIGHT: 197 LBS | OXYGEN SATURATION: 96 % | SYSTOLIC BLOOD PRESSURE: 109 MMHG | BODY MASS INDEX: 38.47 KG/M2

## 2025-07-31 PROCEDURE — 99214 OFFICE O/P EST MOD 30 MIN: CPT

## 2025-07-31 PROCEDURE — 93000 ELECTROCARDIOGRAM COMPLETE: CPT

## 2025-07-31 PROCEDURE — 36415 COLL VENOUS BLD VENIPUNCTURE: CPT

## 2025-07-31 PROCEDURE — G2211 COMPLEX E/M VISIT ADD ON: CPT | Mod: NC

## 2025-08-01 LAB
ALBUMIN SERPL ELPH-MCNC: 4 G/DL
ALP BLD-CCNC: 69 U/L
ALT SERPL-CCNC: 19 U/L
ANION GAP SERPL CALC-SCNC: 11 MMOL/L
APPEARANCE: CLEAR
APTT BLD: 29.4 SEC
AST SERPL-CCNC: 15 U/L
BACTERIA: NEGATIVE /HPF
BASOPHILS # BLD AUTO: 0.07 K/UL
BASOPHILS NFR BLD AUTO: 1 %
BILIRUB SERPL-MCNC: 0.2 MG/DL
BILIRUBIN URINE: NEGATIVE
BLOOD URINE: NEGATIVE
BUN SERPL-MCNC: 14 MG/DL
CALCIUM SERPL-MCNC: 10.5 MG/DL
CAST: 0 /LPF
CHLORIDE SERPL-SCNC: 105 MMOL/L
CO2 SERPL-SCNC: 25 MMOL/L
COLOR: NORMAL
CREAT SERPL-MCNC: 0.67 MG/DL
EGFRCR SERPLBLD CKD-EPI 2021: 98 ML/MIN/1.73M2
EOSINOPHIL # BLD AUTO: 0.12 K/UL
EOSINOPHIL NFR BLD AUTO: 1.7 %
EPITHELIAL CELLS: 2 /HPF
GLUCOSE QUALITATIVE U: NEGATIVE MG/DL
GLUCOSE SERPL-MCNC: 91 MG/DL
HCT VFR BLD CALC: 40.2 %
HGB BLD-MCNC: 13 G/DL
IMM GRANULOCYTES NFR BLD AUTO: 0.3 %
INR PPP: 0.94 RATIO
KETONES URINE: NEGATIVE MG/DL
LEUKOCYTE ESTERASE URINE: NEGATIVE
LYMPHOCYTES # BLD AUTO: 2.59 K/UL
LYMPHOCYTES NFR BLD AUTO: 36.2 %
MAN DIFF?: NORMAL
MCHC RBC-ENTMCNC: 30.9 PG
MCHC RBC-ENTMCNC: 32.3 G/DL
MCV RBC AUTO: 95.5 FL
MICROSCOPIC-UA: NORMAL
MONOCYTES # BLD AUTO: 0.62 K/UL
MONOCYTES NFR BLD AUTO: 8.7 %
NEUTROPHILS # BLD AUTO: 3.74 K/UL
NEUTROPHILS NFR BLD AUTO: 52.1 %
NITRITE URINE: NEGATIVE
PH URINE: 6
PLATELET # BLD AUTO: 253 K/UL
POTASSIUM SERPL-SCNC: 4.6 MMOL/L
PROT SERPL-MCNC: 6.8 G/DL
PROTEIN URINE: NEGATIVE MG/DL
PT BLD: 11.1 SEC
RBC # BLD: 4.21 M/UL
RBC # FLD: 14.5 %
RED BLOOD CELLS URINE: 2 /HPF
SODIUM SERPL-SCNC: 141 MMOL/L
SPECIFIC GRAVITY URINE: 1.02
UROBILINOGEN URINE: 0.2 MG/DL
WBC # FLD AUTO: 7.16 K/UL
WHITE BLOOD CELLS URINE: 0 /HPF

## 2025-08-02 LAB — BACTERIA UR CULT: NORMAL

## 2025-08-07 DIAGNOSIS — M62.85 DYSFUNCTION OF THE MULTIFIDUS MUSCLES, LUMBAR REGION: ICD-10-CM

## 2025-08-07 RX ORDER — DOXYCYCLINE HYCLATE 100 MG/1
100 CAPSULE ORAL
Qty: 14 | Refills: 0 | Status: ACTIVE | COMMUNITY
Start: 2025-08-07 | End: 1900-01-01

## 2025-08-08 ENCOUNTER — APPOINTMENT (OUTPATIENT)
Dept: PULMONOLOGY | Facility: CLINIC | Age: 65
End: 2025-08-08
Payer: COMMERCIAL

## 2025-08-08 VITALS
HEART RATE: 81 BPM | WEIGHT: 198 LBS | TEMPERATURE: 97.1 F | SYSTOLIC BLOOD PRESSURE: 110 MMHG | RESPIRATION RATE: 12 BRPM | HEIGHT: 60 IN | OXYGEN SATURATION: 97 % | BODY MASS INDEX: 38.87 KG/M2 | DIASTOLIC BLOOD PRESSURE: 73 MMHG

## 2025-08-08 DIAGNOSIS — Z01.818 ENCOUNTER FOR OTHER PREPROCEDURAL EXAMINATION: ICD-10-CM

## 2025-08-08 DIAGNOSIS — Z12.2 ENCOUNTER FOR SCREENING FOR MALIGNANT NEOPLASM OF RESPIRATORY ORGANS: ICD-10-CM

## 2025-08-08 PROCEDURE — 99214 OFFICE O/P EST MOD 30 MIN: CPT

## 2025-08-11 ENCOUNTER — TRANSCRIPTION ENCOUNTER (OUTPATIENT)
Age: 65
End: 2025-08-11

## 2025-08-14 ENCOUNTER — NON-APPOINTMENT (OUTPATIENT)
Age: 65
End: 2025-08-14

## 2025-08-21 ENCOUNTER — APPOINTMENT (OUTPATIENT)
Dept: PAIN MANAGEMENT | Facility: CLINIC | Age: 65
End: 2025-08-21

## 2025-08-21 VITALS
HEIGHT: 60 IN | DIASTOLIC BLOOD PRESSURE: 66 MMHG | WEIGHT: 198 LBS | OXYGEN SATURATION: 99 % | SYSTOLIC BLOOD PRESSURE: 101 MMHG | HEART RATE: 87 BPM | BODY MASS INDEX: 38.87 KG/M2

## 2025-08-21 DIAGNOSIS — G89.29 LOW BACK PAIN, UNSPECIFIED: ICD-10-CM

## 2025-08-21 DIAGNOSIS — M54.50 LOW BACK PAIN, UNSPECIFIED: ICD-10-CM

## 2025-08-21 DIAGNOSIS — M54.51 VERTEBROGENIC LOW BACK PAIN: ICD-10-CM

## 2025-08-21 DIAGNOSIS — M62.85 DYSFUNCTION OF THE MULTIFIDUS MUSCLES, LUMBAR REGION: ICD-10-CM

## 2025-08-21 PROCEDURE — 99215 OFFICE O/P EST HI 40 MIN: CPT | Mod: 24

## 2025-09-09 ENCOUNTER — APPOINTMENT (OUTPATIENT)
Dept: PAIN MANAGEMENT | Facility: CLINIC | Age: 65
End: 2025-09-09

## 2025-09-09 PROCEDURE — 95971 ALYS SMPL SP/PN NPGT W/PRGRM: CPT

## 2025-09-09 PROCEDURE — 99215 OFFICE O/P EST HI 40 MIN: CPT

## 2025-09-09 RX ORDER — SULFAMETHOXAZOLE AND TRIMETHOPRIM 800; 160 MG/1; MG/1
800-160 TABLET ORAL
Qty: 14 | Refills: 0 | Status: ACTIVE | COMMUNITY
Start: 2025-09-09 | End: 1900-01-01

## 2025-09-18 ENCOUNTER — APPOINTMENT (OUTPATIENT)
Dept: PAIN MANAGEMENT | Facility: CLINIC | Age: 65
End: 2025-09-18

## 2025-09-18 VITALS
WEIGHT: 198 LBS | OXYGEN SATURATION: 96 % | HEIGHT: 60 IN | BODY MASS INDEX: 38.87 KG/M2 | DIASTOLIC BLOOD PRESSURE: 64 MMHG | HEART RATE: 90 BPM | SYSTOLIC BLOOD PRESSURE: 96 MMHG

## 2025-09-18 DIAGNOSIS — M62.85 DYSFUNCTION OF THE MULTIFIDUS MUSCLES, LUMBAR REGION: ICD-10-CM

## 2025-09-18 DIAGNOSIS — M54.51 VERTEBROGENIC LOW BACK PAIN: ICD-10-CM

## (undated) DEVICE — SNARE CAPTIVATOR RND COLD STIFF 10X2.8MM

## (undated) DEVICE — FORCEP RADIAL JAW 4 240CM DISP